# Patient Record
Sex: FEMALE | Race: WHITE | Employment: FULL TIME | URBAN - METROPOLITAN AREA
[De-identification: names, ages, dates, MRNs, and addresses within clinical notes are randomized per-mention and may not be internally consistent; named-entity substitution may affect disease eponyms.]

---

## 2018-03-07 PROBLEM — S39.012A STRAIN OF LUMBAR REGION: Status: ACTIVE | Noted: 2018-03-07

## 2018-03-07 PROBLEM — G44.219 EPISODIC TENSION-TYPE HEADACHE, NOT INTRACTABLE: Status: ACTIVE | Noted: 2018-03-07

## 2018-03-07 PROBLEM — M62.838 SPASM OF MUSCLE: Status: ACTIVE | Noted: 2018-03-07

## 2018-03-11 ENCOUNTER — APPOINTMENT (OUTPATIENT)
Dept: GENERAL RADIOLOGY | Age: 43
End: 2018-03-11
Payer: MEDICAID

## 2018-03-11 ENCOUNTER — HOSPITAL ENCOUNTER (EMERGENCY)
Age: 43
Discharge: HOME | End: 2018-03-11
Payer: MEDICAID

## 2018-03-11 VITALS
RESPIRATION RATE: 18 BRPM | DIASTOLIC BLOOD PRESSURE: 78 MMHG | WEIGHT: 157 LBS | SYSTOLIC BLOOD PRESSURE: 130 MMHG | HEIGHT: 72 IN | BODY MASS INDEX: 21.26 KG/M2 | OXYGEN SATURATION: 99 % | HEART RATE: 100 BPM | TEMPERATURE: 98.1 F

## 2018-03-11 DIAGNOSIS — S62.339A CLOSED BOXER'S FRACTURE, INITIAL ENCOUNTER: Primary | ICD-10-CM

## 2018-03-11 PROCEDURE — 73130 X-RAY EXAM OF HAND: CPT

## 2018-03-11 PROCEDURE — 6370000000 HC RX 637 (ALT 250 FOR IP): Performed by: PHYSICIAN ASSISTANT

## 2018-03-11 PROCEDURE — 99283 EMERGENCY DEPT VISIT LOW MDM: CPT

## 2018-03-11 RX ORDER — IBUPROFEN 600 MG/1
600 TABLET ORAL ONCE
Status: COMPLETED | OUTPATIENT
Start: 2018-03-11 | End: 2018-03-11

## 2018-03-11 RX ORDER — ACETAMINOPHEN AND CODEINE PHOSPHATE 300; 30 MG/1; MG/1
1 TABLET ORAL EVERY 6 HOURS PRN
Qty: 12 TABLET | Refills: 0 | Status: SHIPPED | OUTPATIENT
Start: 2018-03-11 | End: 2018-03-14

## 2018-03-11 RX ADMIN — IBUPROFEN 600 MG: 600 TABLET, FILM COATED ORAL at 14:16

## 2018-03-11 ASSESSMENT — PAIN SCALES - GENERAL
PAINLEVEL_OUTOF10: 5
PAINLEVEL_OUTOF10: 5

## 2018-03-11 ASSESSMENT — PAIN DESCRIPTION - PROGRESSION: CLINICAL_PROGRESSION: GRADUALLY WORSENING

## 2018-03-11 ASSESSMENT — PAIN DESCRIPTION - LOCATION: LOCATION: HAND

## 2018-03-11 ASSESSMENT — PAIN DESCRIPTION - ONSET: ONSET: SUDDEN

## 2018-03-11 ASSESSMENT — PAIN DESCRIPTION - FREQUENCY: FREQUENCY: CONTINUOUS

## 2018-03-11 ASSESSMENT — PAIN DESCRIPTION - DESCRIPTORS: DESCRIPTORS: SORE

## 2018-03-11 ASSESSMENT — PAIN DESCRIPTION - PAIN TYPE: TYPE: ACUTE PAIN

## 2018-03-11 ASSESSMENT — PAIN DESCRIPTION - ORIENTATION: ORIENTATION: RIGHT

## 2018-03-11 NOTE — ED PROVIDER NOTES
encounter      Plan   Discharge to home  Patient condition is stable    New Medications     New Prescriptions    ACETAMINOPHEN-CODEINE (TYLENOL/CODEINE #3) 300-30 MG PER TABLET    Take 1 tablet by mouth every 6 hours as needed for Pain for up to 3 days For severe pain only. Do not take working, driving or drinking alcohol. May cause drowsiness. Electronically signed by SIMON Crum   DD: 3/11/18  **This report was transcribed using voice recognition software. Every effort was made to ensure accuracy; however, inadvertent computerized transcription errors may be present.   END OF ED PROVIDER NOTE       SIMON Crum  03/11/18 [address] [place], [place]  03/11/18 8422

## 2018-11-06 ENCOUNTER — HOSPITAL ENCOUNTER (EMERGENCY)
Age: 43
Discharge: HOME | End: 2018-11-06
Payer: MEDICAID

## 2018-11-06 ENCOUNTER — APPOINTMENT (OUTPATIENT)
Dept: GENERAL RADIOLOGY | Age: 43
End: 2018-11-06
Payer: MEDICAID

## 2018-11-06 VITALS
HEIGHT: 72 IN | SYSTOLIC BLOOD PRESSURE: 110 MMHG | BODY MASS INDEX: 21.26 KG/M2 | OXYGEN SATURATION: 99 % | TEMPERATURE: 98.5 F | HEART RATE: 70 BPM | DIASTOLIC BLOOD PRESSURE: 70 MMHG | RESPIRATION RATE: 14 BRPM | WEIGHT: 157 LBS

## 2018-11-06 DIAGNOSIS — R05.9 COUGH: ICD-10-CM

## 2018-11-06 DIAGNOSIS — J40 BRONCHITIS: Primary | ICD-10-CM

## 2018-11-06 PROCEDURE — 6370000000 HC RX 637 (ALT 250 FOR IP): Performed by: PHYSICIAN ASSISTANT

## 2018-11-06 PROCEDURE — 99283 EMERGENCY DEPT VISIT LOW MDM: CPT

## 2018-11-06 PROCEDURE — 71046 X-RAY EXAM CHEST 2 VIEWS: CPT

## 2018-11-06 RX ORDER — ALBUTEROL SULFATE 90 UG/1
2 AEROSOL, METERED RESPIRATORY (INHALATION) EVERY 6 HOURS PRN
Qty: 1 INHALER | Refills: 0 | Status: SHIPPED | OUTPATIENT
Start: 2018-11-06 | End: 2021-03-22 | Stop reason: ALTCHOICE

## 2018-11-06 RX ORDER — METHYLPREDNISOLONE 4 MG/1
TABLET ORAL
Qty: 21 TABLET | Status: SHIPPED | OUTPATIENT
Start: 2018-11-06 | End: 2018-11-12

## 2018-11-06 RX ORDER — IPRATROPIUM BROMIDE AND ALBUTEROL SULFATE 2.5; .5 MG/3ML; MG/3ML
1 SOLUTION RESPIRATORY (INHALATION) ONCE
Status: COMPLETED | OUTPATIENT
Start: 2018-11-06 | End: 2018-11-06

## 2018-11-06 RX ORDER — BENZONATATE 100 MG/1
100 CAPSULE ORAL 3 TIMES DAILY PRN
Qty: 21 CAPSULE | Refills: 0 | Status: SHIPPED | OUTPATIENT
Start: 2018-11-06 | End: 2018-11-13

## 2018-11-06 RX ADMIN — IPRATROPIUM BROMIDE AND ALBUTEROL SULFATE 1 AMPULE: .5; 3 SOLUTION RESPIRATORY (INHALATION) at 13:31

## 2018-11-06 ASSESSMENT — PAIN SCALES - GENERAL: PAINLEVEL_OUTOF10: 6

## 2018-11-06 ASSESSMENT — PAIN DESCRIPTION - LOCATION: LOCATION: CHEST

## 2018-11-06 ASSESSMENT — PAIN DESCRIPTION - ORIENTATION: ORIENTATION: ANTERIOR

## 2018-11-06 ASSESSMENT — PAIN DESCRIPTION - FREQUENCY: FREQUENCY: CONTINUOUS

## 2018-11-06 NOTE — ED PROVIDER NOTES
[facility], [place]     Department of Emergency Medicine   ED  Provider Note  Admit Date/RoomTime: 11/6/2018 12:33 PM  ED Room: 03/03  Chief Complaint:   Cough (x 2 weeks) and Nasal Congestion (started 2 weeks ago)    History of Present Illness   Source of history provided by:  patient. History/Exam Limitations: none. Dom Gomez is a 44 y.o. old female with a past medical history of:   Past Medical History:   Diagnosis Date    Anxiety     Asthma     COPD (chronic obstructive pulmonary disease) ([address]     told a few weeks ago at [facility] Ectopic pregnancy     Migraines     Ovarian cyst     Tobacco abuse     presents to the emergency department by private vehicle, for cough and nasal congestion, which began 2 week(s) prior to arrival.  Since onset the symptoms have been persistent and mild in severity. The symptoms are associated with nasal congestion, sinus headache, and non-productive cough. There has been NO fever, chills, nausea, vomiting, hemoptysis, chest pain, SOB. Patient states that her 25year-old son had similar symptoms. Patient states that she is no longer a smoker because she quit 3 months ago had a history of 6-7 years of smoking. She denies any recent travel, recent surgery, calf pain, swelling, history of blood clots, birth control use or estrogen replacement. She notes she has a hx of COPD and asthma. PERC Rule for PE:      Age ? 50 negative     HR ? 100 negative     O2 Sat on Room Air < 95% negative     Prior History of DVT/PE negative     Recent Trauma or Surgery negative     Hemoptysis negative     Exogenous Estrogen/Hormone Use negative     Unilateral Extgremity Swelling negative     * If ANY Criteria are positive, the PERC rule is not satisfied and cannot be used to rule out PE in this patient. ROS   Pertinent positives and negatives are stated within HPI, all other systems reviewed and are negative.     Past Surgical History:  has a past surgical history that includes

## 2019-10-27 ENCOUNTER — APPOINTMENT (OUTPATIENT)
Dept: CT IMAGING | Age: 44
End: 2019-10-27
Payer: MEDICAID

## 2019-10-27 ENCOUNTER — HOSPITAL ENCOUNTER (EMERGENCY)
Age: 44
Discharge: HOME | End: 2019-10-27
Payer: MEDICAID

## 2019-10-27 VITALS
RESPIRATION RATE: 16 BRPM | BODY MASS INDEX: 21.13 KG/M2 | HEIGHT: 72 IN | DIASTOLIC BLOOD PRESSURE: 65 MMHG | HEART RATE: 88 BPM | SYSTOLIC BLOOD PRESSURE: 125 MMHG | WEIGHT: 156 LBS | TEMPERATURE: 98.7 F | OXYGEN SATURATION: 98 %

## 2019-10-27 DIAGNOSIS — S30.0XXA CONTUSION OF SACRUM, INITIAL ENCOUNTER: Primary | ICD-10-CM

## 2019-10-27 LAB
HCG, URINE, POC: NEGATIVE
Lab: NORMAL
NEGATIVE QC PASS/FAIL: NORMAL
POSITIVE QC PASS/FAIL: NORMAL

## 2019-10-27 PROCEDURE — 72192 CT PELVIS W/O DYE: CPT

## 2019-10-27 PROCEDURE — 6360000002 HC RX W HCPCS: Performed by: PHYSICIAN ASSISTANT

## 2019-10-27 PROCEDURE — 99284 EMERGENCY DEPT VISIT MOD MDM: CPT

## 2019-10-27 PROCEDURE — 96372 THER/PROPH/DIAG INJ SC/IM: CPT

## 2019-10-27 RX ORDER — KETOROLAC TROMETHAMINE 30 MG/ML
60 INJECTION, SOLUTION INTRAMUSCULAR; INTRAVENOUS ONCE
Status: COMPLETED | OUTPATIENT
Start: 2019-10-27 | End: 2019-10-27

## 2019-10-27 RX ORDER — HYDROCODONE BITARTRATE AND ACETAMINOPHEN 5; 325 MG/1; MG/1
1 TABLET ORAL EVERY 6 HOURS PRN
Qty: 12 TABLET | Refills: 0 | Status: SHIPPED | OUTPATIENT
Start: 2019-10-27 | End: 2019-10-30

## 2019-10-27 RX ADMIN — KETOROLAC TROMETHAMINE 60 MG: 30 INJECTION, SOLUTION INTRAMUSCULAR at 21:41

## 2019-10-27 ASSESSMENT — PAIN DESCRIPTION - PAIN TYPE: TYPE: ACUTE PAIN

## 2019-10-27 ASSESSMENT — PAIN SCALES - GENERAL
PAINLEVEL_OUTOF10: 9
PAINLEVEL_OUTOF10: 8

## 2019-10-27 ASSESSMENT — PAIN DESCRIPTION - LOCATION: LOCATION: BUTTOCKS

## 2021-02-06 ENCOUNTER — HOSPITAL ENCOUNTER (EMERGENCY)
Age: 46
Discharge: HOME | End: 2021-02-06
Payer: COMMERCIAL

## 2021-02-06 VITALS
OXYGEN SATURATION: 100 % | RESPIRATION RATE: 14 BRPM | BODY MASS INDEX: 19.64 KG/M2 | WEIGHT: 145 LBS | DIASTOLIC BLOOD PRESSURE: 85 MMHG | HEART RATE: 95 BPM | HEIGHT: 72 IN | TEMPERATURE: 98.3 F | SYSTOLIC BLOOD PRESSURE: 137 MMHG

## 2021-02-06 DIAGNOSIS — S61.412A LACERATION OF LEFT HAND WITHOUT FOREIGN BODY, INITIAL ENCOUNTER: Primary | ICD-10-CM

## 2021-02-06 PROCEDURE — 6360000002 HC RX W HCPCS: Performed by: PHYSICIAN ASSISTANT

## 2021-02-06 PROCEDURE — 90471 IMMUNIZATION ADMIN: CPT | Performed by: PHYSICIAN ASSISTANT

## 2021-02-06 PROCEDURE — 99283 EMERGENCY DEPT VISIT LOW MDM: CPT

## 2021-02-06 PROCEDURE — 12002 RPR S/N/AX/GEN/TRNK2.6-7.5CM: CPT

## 2021-02-06 PROCEDURE — 90715 TDAP VACCINE 7 YRS/> IM: CPT | Performed by: PHYSICIAN ASSISTANT

## 2021-02-06 RX ORDER — NAPROXEN 500 MG/1
500 TABLET ORAL 2 TIMES DAILY
Qty: 60 TABLET | Refills: 0 | Status: SHIPPED | OUTPATIENT
Start: 2021-02-06 | End: 2021-03-22 | Stop reason: ALTCHOICE

## 2021-02-06 RX ORDER — LIDOCAINE HYDROCHLORIDE 10 MG/ML
20 INJECTION, SOLUTION INFILTRATION; PERINEURAL ONCE
Status: DISCONTINUED | OUTPATIENT
Start: 2021-02-06 | End: 2021-02-06 | Stop reason: HOSPADM

## 2021-02-06 RX ADMIN — TETANUS TOXOID, REDUCED DIPHTHERIA TOXOID AND ACELLULAR PERTUSSIS VACCINE, ADSORBED 0.5 ML: 5; 2.5; 8; 8; 2.5 SUSPENSION INTRAMUSCULAR at 14:14

## 2021-02-06 NOTE — ED PROVIDER NOTES
SpO2 100%   BMI 19.67 kg/m²      Oxygen Saturation Interpretation: Normal.    Constitutional:  Alert and oriented x4, development consistent with age. HEENT:  NC/NT. Airway patent. PERRLA, EOMI   Neck:  Normal ROM. Supple. Non-tender. Extremities: 3cm laceration to dorsal aspect of proximal thumb and snuffbox region, no active bleeding, full ROM thumb and all other fingers, intact sensations distally, full ROM wrist   Lymphatics: No lymphangitis or adenopathy noted. Neurological: CN II-XII grossly intact, Motor functions intact. Lab / Imaging Results   (All laboratory and radiology results have been personally reviewed by myself)  Labs:  No results found for this visit on 02/06/21. Imaging: All Radiology results interpreted by Radiologist unless otherwise noted. No orders to display     ED Course / Medical Decision Making     Medications   Tetanus-Diphth-Acell Pertussis (BOOSTRIX) injection 0.5 mL (0.5 mLs Intramuscular Given 2/6/21 1414)      Consult(s):  None    Procedure(s):  PROCEDURE NOTE      LACERATION REPAIR  Risks, benefits and alternatives (for applicable procedures below) described. Performed By: Tomás Christian PA-C. Laceration #: 1. Location: left thumb- snuff box region   Length: 3cm. The wound area was irrigated with sterile saline, cleansed with povidone iodine, cleansend with shur-clens and draped in a sterile fashion. Local Anesthesia:  Lidocaine 1% without epinephrine. The wound was explored with the following results:  no foreign body or tendon injury seen. Flaps Aligned: yes. The wound was closed with 5-0 Ethilon using interrupted sutures. Dressing:  bacitracin and a sterile dressing was placed. Total number suture:  7. There were no additional lacerations requiring repair. MDM:     Counseling:     The emergency provider has spoken with the patient/caregiver and discussed todays results, in addition to providing specific details for the plan of care and counseling regarding the diagnosis and prognosis. Questions are answered at this time and they are agreeable with the plan. Pt was educated on signs of infection and wound care. Pt was advised to return to ED if signs of infection or fever develop. Pt understands they must follow-up with PCP for suture removal and/or wound check. Pt remained nontoxic, afebrile, and A&O x3 during this ED visit. They agreed with plan of care, discharge, and importance of follow-up. Pt was in no distress at discharge. Vitals stable. All results reviewed with pt and all questions answered. Patient was educated on newly prescribed medication. Patient educated on wound care. Assessment     1. Laceration of left hand without foreign body, initial encounter      Plan   Discharge to home  Patient condition is good    New Medications     Discharge Medication List as of 2/6/2021  2:34 PM      START taking these medications    Details   naproxen (NAPROSYN) 500 MG tablet Take 1 tablet by mouth 2 times daily, Disp-60 tablet, R-0Print             Electronically signed by Grace Suh PA-C   DD: 2/6/21  **This report was transcribed using voice recognition software. Every effort was made to ensure accuracy; however, inadvertent computerized transcription errors may be present.     END OF ED PROVIDER NOTE        Grace Suh PA-C  02/06/21 4862

## 2021-03-22 ENCOUNTER — APPOINTMENT (OUTPATIENT)
Dept: GENERAL RADIOLOGY | Age: 46
End: 2021-03-22
Payer: COMMERCIAL

## 2021-03-22 ENCOUNTER — HOSPITAL ENCOUNTER (EMERGENCY)
Age: 46
Discharge: HOME | End: 2021-03-22
Payer: COMMERCIAL

## 2021-03-22 VITALS
SYSTOLIC BLOOD PRESSURE: 110 MMHG | WEIGHT: 147 LBS | BODY MASS INDEX: 19.91 KG/M2 | DIASTOLIC BLOOD PRESSURE: 76 MMHG | HEIGHT: 72 IN | OXYGEN SATURATION: 97 % | TEMPERATURE: 97.5 F | HEART RATE: 90 BPM | RESPIRATION RATE: 16 BRPM

## 2021-03-22 DIAGNOSIS — S46.911A STRAIN OF RIGHT SHOULDER, INITIAL ENCOUNTER: Primary | ICD-10-CM

## 2021-03-22 PROCEDURE — 99283 EMERGENCY DEPT VISIT LOW MDM: CPT

## 2021-03-22 PROCEDURE — 6360000002 HC RX W HCPCS: Performed by: NURSE PRACTITIONER

## 2021-03-22 PROCEDURE — 96372 THER/PROPH/DIAG INJ SC/IM: CPT

## 2021-03-22 PROCEDURE — 73030 X-RAY EXAM OF SHOULDER: CPT

## 2021-03-22 PROCEDURE — 73080 X-RAY EXAM OF ELBOW: CPT

## 2021-03-22 PROCEDURE — 73060 X-RAY EXAM OF HUMERUS: CPT

## 2021-03-22 RX ORDER — KETOROLAC TROMETHAMINE 30 MG/ML
30 INJECTION, SOLUTION INTRAMUSCULAR; INTRAVENOUS ONCE
Status: COMPLETED | OUTPATIENT
Start: 2021-03-22 | End: 2021-03-22

## 2021-03-22 RX ORDER — CYCLOBENZAPRINE HCL 5 MG
5 TABLET ORAL 2 TIMES DAILY PRN
Qty: 10 TABLET | Refills: 0 | Status: SHIPPED | OUTPATIENT
Start: 2021-03-22 | End: 2021-04-01

## 2021-03-22 RX ADMIN — KETOROLAC TROMETHAMINE 30 MG: 30 INJECTION, SOLUTION INTRAMUSCULAR at 20:14

## 2021-03-22 ASSESSMENT — PAIN DESCRIPTION - DESCRIPTORS: DESCRIPTORS: CONSTANT;THROBBING;SHARP

## 2021-03-22 ASSESSMENT — PAIN DESCRIPTION - ONSET: ONSET: ON-GOING

## 2021-03-22 ASSESSMENT — PAIN SCALES - GENERAL: PAINLEVEL_OUTOF10: 7

## 2021-03-22 ASSESSMENT — PAIN DESCRIPTION - ORIENTATION: ORIENTATION: RIGHT

## 2021-03-22 ASSESSMENT — PAIN DESCRIPTION - PROGRESSION: CLINICAL_PROGRESSION: NOT CHANGED

## 2021-03-22 ASSESSMENT — PAIN DESCRIPTION - LOCATION: LOCATION: ARM;SHOULDER

## 2021-03-23 NOTE — ED NOTES
Discharged  To follow with ortho referral-  When asked about pain-  Pt replied  \"It's ten times worse\"  rx x 1 with ice pack given     Hal Vásquez RN  03/22/21 [id number]

## 2021-03-23 NOTE — ED PROVIDER NOTES
[address]  Department of Emergency Medicine   ED  Encounter Note  Admit Date/RoomTime: 3/22/2021  7:44 PM  ED Room: 15/15    NAME: Juan Jose Tyler  : 1975  MRN: [id number]     Chief Complaint:  Shoulder Pain (right sided for past couple of months. patient states she was taking care of a Pitty, and she thinks it may have happened during the time she cared for it d/t pulling when walking) and Arm Pain    History of Present Illness       Juan Jose Tyler is a 41 y.o. old female who presents to the emergency department for complaint of right shoulder pain and right humerus pain. She reports onset of symptoms approximately 2 months ago. Reports watching a friend's pit bull dog, who weighed approximately 150 pounds, pulled her right arm while she was walking the dog. Reports that the pain has continued over the past several weeks. She reports being right-hand dominant. That she has been using ibuprofen with no significant improvement. Denies paresthesias, fever, chills, extremity weakness, or any other complaints at this time. Since onset the symptoms have been waxing and waning. Her pain is aggraveated by any movement or pressure on or palpation of and relieved by nothing. She denies any head injury, headache, loss of consciousness, confusion, chest pain, abdominal pain, back pain, numbness or weakness. ROS   Pertinent positives and negatives are stated within HPI, all other systems reviewed and are negative. Past Medical History:  has a past medical history of Anxiety, Asthma, COPD (chronic obstructive pulmonary disease) ([address] Ectopic pregnancy, Migraines, Ovarian cyst, and Tobacco abuse. Surgical History:  has a past surgical history that includes Ovarian cyst surgery. Social History:  reports that she has been smoking. She has a 2.25 pack-year smoking history.  She has never used smokeless tobacco. She reports that she does not drink alcohol or use drugs. Family History: family history includes Heart Attack in her father; Heart Disease in her mother; Stroke in her father. Allergies: Morphine and Penicillins    Physical Exam   Oxygen Saturation Interpretation: Normal.        ED Triage Vitals   BP Temp Temp Source Pulse Resp SpO2 Height Weight   03/22/21 1943 03/22/21 1943 03/22/21 1943 03/22/21 2108 03/22/21 1943 03/22/21 2108 03/22/21 1950 03/22/21 1950   110/76 97.5 °F (36.4 °C) Temporal 90 16 97 % 6' (1.829 m) 147 lb (66.7 kg)         Constitutional:  Alert, development consistent with age. Neck:  Normal ROM. Supple. Non-tender. Shoulder:  Right acromioclavicular joint. Tenderness: mild              Swelling: None. Deformity: no.              ROM: diminished range with pain. Skin:  normal exam; no wounds, erythema, or swelling. Neurovascular: Motor deficit: none. Sensory deficit: none. Pulse deficit: none. Capillary refill: normal.    Elbow:              Tenderness:  mild. Swelling: None. Deformity: no.              ROM: full range with pain. Skin:  normal exam; no wounds, erythema, or swelling. Mild tenderness noted to bicep region. No swelling, erythema, or wounds. Full flexion and extension of right elbow. No neurovascular deficits. No motor or sensory deficits. Compartments soft and compressible. Lymphatics: No lymphangitis or edema noted  Neurological:  Oriented. Motor functions intact. Lab / Imaging Results   (All laboratory and radiology results have been personally reviewed by myself)  Labs:  No results found for this visit on 03/22/21. Imaging: All Radiology results interpreted by Radiologist unless otherwise noted. XR SHOULDER RIGHT (MIN 2 VIEWS)   Final Result   No acute abnormality. XR HUMERUS RIGHT (MIN 2 VIEWS)   Final Result   No acute osseous abnormality.          XR ELBOW RIGHT (MIN 3 VIEWS)   Final Result   No acute abnormality. ED Course / Medical Decision Making     Medications   ketorolac (TORADOL) injection 30 mg (30 mg Intramuscular Given 3/22/21 2014)          Consult(s):   None    Procedure(s):   none    MDM:      Steven Camacho is a 78-year-old female who presented to the emergency department for complaint of right shoulder and right upper extremity pain. She reported onset of symptoms 2 months ago after she was walking a pit bull who weighed approximately 150 pounds and pulled on her right arm. No paresthesias. No extremity weakness. No neurovascular deficits. Compartments soft and compressible. No motor or sensory deficits. Imaging of right shoulder, right humerus, and right elbow were all reassuring for no acute osseous abnormalities or malalignments. Her pain was primarily localized to her right biceps region. She had full flexion and extension of her right elbow. He was given orthopedic contact information and instructed to call for follow-up appointment. She verbalized understanding agrees with plan. She remained hemodynamically stable, nontoxic, and appropriate for outpatient management. Prescribed Flexeril. He was instructed to continue with NSAID as prescribed. Advised to return for any new, change, worsening symptoms or concerns. At this time the patient is without objective evidence of an acute process requiring hospitalization or inpatient management. They have remained hemodynamically stable throughout their entire ED visit and are stable for discharge with outpatient follow-up. The plan has been discussed in detail and they are aware of the specific conditions for emergent return, as well as the importance of follow-up. Plan of Care/Counseling:  I reviewed today's visit with the patient in addition to providing specific details for the plan of care and counseling regarding the diagnosis and prognosis.   Questions are answered at

## 2021-04-23 ENCOUNTER — HOSPITAL ENCOUNTER (EMERGENCY)
Age: 46
Discharge: HOME | End: 2021-04-23
Payer: COMMERCIAL

## 2021-04-23 VITALS
SYSTOLIC BLOOD PRESSURE: 123 MMHG | HEIGHT: 72 IN | WEIGHT: 147 LBS | DIASTOLIC BLOOD PRESSURE: 58 MMHG | OXYGEN SATURATION: 99 % | BODY MASS INDEX: 19.91 KG/M2 | TEMPERATURE: 97.5 F | HEART RATE: 80 BPM | RESPIRATION RATE: 18 BRPM

## 2021-04-23 DIAGNOSIS — M25.511 RIGHT SHOULDER PAIN, UNSPECIFIED CHRONICITY: Primary | ICD-10-CM

## 2021-04-23 LAB
HCG, URINE, POC: NEGATIVE
Lab: NORMAL
NEGATIVE QC PASS/FAIL: NORMAL
POSITIVE QC PASS/FAIL: NORMAL

## 2021-04-23 PROCEDURE — 99283 EMERGENCY DEPT VISIT LOW MDM: CPT

## 2021-04-23 RX ORDER — HYDROCODONE BITARTRATE AND ACETAMINOPHEN 5; 325 MG/1; MG/1
1 TABLET ORAL EVERY 12 HOURS PRN
Qty: 6 TABLET | Refills: 0 | Status: SHIPPED | OUTPATIENT
Start: 2021-04-23 | End: 2021-04-26

## 2021-04-23 ASSESSMENT — PAIN DESCRIPTION - ORIENTATION: ORIENTATION: RIGHT

## 2021-04-23 ASSESSMENT — PAIN SCALES - GENERAL: PAINLEVEL_OUTOF10: 7

## 2021-04-23 ASSESSMENT — PAIN DESCRIPTION - PAIN TYPE: TYPE: ACUTE PAIN

## 2021-04-23 ASSESSMENT — PAIN DESCRIPTION - LOCATION: LOCATION: SHOULDER

## 2021-04-23 NOTE — ED PROVIDER NOTES
[facility]  Department of Emergency Medicine   ED  Encounter Note  Admit Date/RoomTime: 2021 12:44 PM  ED Room: 35/35    NAME: Evaristo Harp  : 1975  MRN: [id number]     Chief Complaint:  Shoulder Pain (right shoulder (was seen in [place] [facility] 2.5 weeks ago, reports MRI shows torn rotator cuff))    History of Present Illness       Evaristo Harp is a 41 y.o. old female who presents to the emergency department by private vehicle, for traumatic Right shoulder pain which occured 3 month(s) prior to arrival.  The complaint is due to watching her friend's dog who weighs 150 pounds when she looked down to check her phone and the dog jerked the leash to rose after a rabbit. Patient has no prior history of pain/injury with regards to today's visit. She is right handed. Since onset the symptoms have been recurrent and worsening. Her pain is aggraveated by any movement, any use of or pressure on or palpation of and relieved by nothing, as no treatment has been provided prior to this visit. She denies any head injury, headache, loss of consciousness, confusion, dizziness, neck pain, chest pain, abdominal pain, back pain, numbness, weakness, blurred vision, nausea or vomiting. She was seen here 1 month ago on . And was referred to orthopedics at that time. She states she called but never heard back from them and never retry calling. She followed up with a friend's chiropractor who ordered an MRI which she completed 3 days ago. She states pain is becoming unbearable. She denies any new injury or trauma besides the incident 3 months ago. She does not have a PCP to follow-up with. She states that she was given a sling but does not like the feeling of it has not been wearing it. ROS   Pertinent positives and negatives are stated within HPI, all other systems reviewed and are negative.     Past Medical History:  has a past medical history of Anxiety, Asthma, COPD (chronic obstructive pulmonary disease) ([facility] [address] Ectopic pregnancy, Migraines, Ovarian cyst, and Tobacco abuse. Surgical History:  has a past surgical history that includes Ovarian cyst surgery. Social History:  reports that she has been smoking. She has a 2.25 pack-year smoking history. She has never used smokeless tobacco. She reports that she does not drink alcohol or use drugs. Family History: family history includes Heart Attack in her father; Heart Disease in her mother; Stroke in her father. Allergies: Morphine and Penicillins    Physical Exam   Oxygen Saturation Interpretation: Normal.        ED Triage Vitals   BP Temp Temp Source Pulse Resp SpO2 Height Weight   04/23/21 1241 04/23/21 1239 04/23/21 1239 04/23/21 1239 04/23/21 1239 04/23/21 1239 04/23/21 1239 04/23/21 1239   (!) 123/58 97.5 °F (36.4 °C) Oral 80 18 99 % 6' (1.829 m) 147 lb (66.7 kg)         Constitutional:  Alert, development consistent with age. Neck:  Normal ROM. Supple. Non-tender. Shoulder:  Right anterior. Tenderness: moderate              Swelling: None. Deformity: no.              ROM: diminished range with pain. Skin:  normal exam; no wounds, erythema, or swelling. Neurovascular: Motor deficit: none. Sensory deficit: none. Pulse deficit: none. Capillary refill: normal.    Elbow:              Tenderness:  none. Swelling: None. Deformity: no.              ROM: full painless ROM. Skin:  normal exam; no wounds, erythema, or swelling. Lymphatics: No lymphangitis or edema noted  Neurological:  Oriented. Motor functions intact.     Lab / Imaging Results   (All laboratory and radiology results have been personally reviewed by myself)  Labs:  Results for orders placed or performed during the hospital encounter of 04/23/21   POC Pregnancy Urine   Result Value Ref Range    HCG, Urine, POC Negative Negative    Lot Number [id number]     Positive QC Pass/Fail Acceptable     Negative QC Pass/Fail Acceptable      Imaging: All Radiology results interpreted by Radiologist unless otherwise noted. No orders to display     Patient had MRI of right shoulder without contrast on 4 2021. Results were reviewed and showed a superspinatus tendinosis with a small high grade interstitial tear at the central insertional tendon fibers. Inferior glenohumeral ligament capsular thickening can be seen in the in the clinical setting of adhesive capsulitis if clinically appropriate. Minor acro midclavicular arthrosis with mild subcromial bursal edema. ED Course / Medical Decision Making   Medications - No data to display     Re-examination:  4/23/21       Time: 1310-patient states she was driving to the emergency room today. She states he is unable to get a ride home. She is refusing Tylenol or NSAIDs in the emergency room and states that she has it at home. 1327-discussed with patient a short 3-day prescription of pain medication, Leobardo Star will be prescribed until patient can follow-up Monday with orthopedics due to it being the weekend. She states verbal understanding. OARRS was reviewed and no signs symptoms of drug abuse was noted. Discussed potential side effects of starting this medication appropriate use. Struck to her this may increase the risk of sedation addiction and not to drive when taking this medication. She states verbal understanding. Instructed her to be placed in a sling. Patient refuses a sling. Consult(s):   None    Procedure(s):   none    MDM: Patient presents to the ED for right shoulder pain which she had an MRI done a few days ago which showed a tear. Differential diagnoses included but not limited to new injury versus uncontrolled pain. Workup in the ED revealed patient has no neurovascular compromise.   Patient has limited range of motion due to pain and tenderness on the anterior aspect. MRI from [facility] imaging was reviewed which showed a tear. Patient states her chiropractor ordered this and is unable to give her pain medication. She has no PCP and does not have a referral for orthopedic at this time. Her chiropractor has not gotten back to her about referral for orthopedic. She refuses sling and states she does not like it around her neck. I was unable to give her anything for pain due to not wanting any NSAIDs, already taking Tylenol and has unable to get a ride home. Patient continues to be non-toxic on re-evaluation. Findings were discussed with the patient and reasons to immediately return to the ED were articulated to them. They will follow-up with their PMD and orthopedics. Family medicine clinic referral was given for PCP. Mary Tolbert OARRS was reviewed and no signs symptoms of drug abuse was noted. Patient was given 1 short course prescription for Norco for pain until patient can follow-up on Monday for further evaluation. Patient states she is unable to take NSAIDs and Tylenol has not been helping. Discussed potential side effects of starting this medication, including sedation addiction. Instructed her not to drive will take this medication. She states verbal understanding. Plan of Care/Counseling:  I reviewed today's visit with the patient in addition to providing specific details for the plan of care and counseling regarding the diagnosis and prognosis. Questions are answered at this time and are agreeable with the plan. Assessment      1. Right shoulder pain, unspecified chronicity      Plan   Discharge home. Patient condition is stable    New Medications     Discharge Medication List as of 4/23/2021  1:29 PM      START taking these medications    Details   HYDROcodone-acetaminophen (NORCO) 5-325 MG per tablet Take 1 tablet by mouth every 12 hours as needed for Pain for up to 3 days. Intended supply: 3 days.  Take lowest dose possible to manage pain, Disp-6 tablet, R-0Print           Electronically signed by GUME Pepper CNP   DD: 4/23/21  **This report was transcribed using voice recognition software. Every effort was made to ensure accuracy; however, inadvertent computerized transcription errors may be present.   END OF ED PROVIDER NOTE          GUME Pepper CNP  04/23/21 [id number]

## 2021-10-27 ENCOUNTER — HOSPITAL ENCOUNTER (EMERGENCY)
Age: 46
Discharge: LEFT BEFORE DISCHARGE | End: 2021-10-27
Payer: COMMERCIAL

## 2021-10-27 ENCOUNTER — HOSPITAL ENCOUNTER (INPATIENT)
Age: 46
LOS: 2 days | Discharge: HOME | DRG: 364 | End: 2021-10-30
Attending: STUDENT IN AN ORGANIZED HEALTH CARE EDUCATION/TRAINING PROGRAM | Admitting: INTERNAL MEDICINE
Payer: COMMERCIAL

## 2021-10-27 ENCOUNTER — APPOINTMENT (OUTPATIENT)
Dept: CT IMAGING | Age: 46
DRG: 364 | End: 2021-10-27
Payer: COMMERCIAL

## 2021-10-27 DIAGNOSIS — D72.829 LEUKOCYTOSIS, UNSPECIFIED TYPE: ICD-10-CM

## 2021-10-27 DIAGNOSIS — L03.211 FACIAL CELLULITIS: Primary | ICD-10-CM

## 2021-10-27 DIAGNOSIS — L02.91 ABSCESS: ICD-10-CM

## 2021-10-27 LAB
ANION GAP SERPL CALCULATED.3IONS-SCNC: 13 MMOL/L (ref 7–16)
BASOPHILS ABSOLUTE: 0.06 E9/L (ref 0–0.2)
BASOPHILS RELATIVE PERCENT: 0.3 % (ref 0–2)
BUN BLDV-MCNC: 8 MG/DL (ref 6–20)
C-REACTIVE PROTEIN: 3.4 MG/DL (ref 0–0.4)
CALCIUM SERPL-MCNC: 9.1 MG/DL (ref 8.6–10.2)
CHLORIDE BLD-SCNC: 101 MMOL/L (ref 98–107)
CO2: 23 MMOL/L (ref 22–29)
CREAT SERPL-MCNC: 0.6 MG/DL (ref 0.5–1)
EOSINOPHILS ABSOLUTE: 0.07 E9/L (ref 0.05–0.5)
EOSINOPHILS RELATIVE PERCENT: 0.4 % (ref 0–6)
GFR AFRICAN AMERICAN: >60
GFR NON-AFRICAN AMERICAN: >60 ML/MIN/1.73
GLUCOSE BLD-MCNC: 98 MG/DL (ref 74–99)
HCT VFR BLD CALC: 44.3 % (ref 34–48)
HEMOGLOBIN: 14.8 G/DL (ref 11.5–15.5)
IMMATURE GRANULOCYTES #: 0.12 E9/L
IMMATURE GRANULOCYTES %: 0.6 % (ref 0–5)
LACTIC ACID: 0.9 MMOL/L (ref 0.5–2.2)
LYMPHOCYTES ABSOLUTE: 2.02 E9/L (ref 1.5–4)
LYMPHOCYTES RELATIVE PERCENT: 10.2 % (ref 20–42)
MCH RBC QN AUTO: 30.1 PG (ref 26–35)
MCHC RBC AUTO-ENTMCNC: 33.4 % (ref 32–34.5)
MCV RBC AUTO: 90.2 FL (ref 80–99.9)
MONOCYTES ABSOLUTE: 1.47 E9/L (ref 0.1–0.95)
MONOCYTES RELATIVE PERCENT: 7.4 % (ref 2–12)
NEUTROPHILS ABSOLUTE: 16.01 E9/L (ref 1.8–7.3)
NEUTROPHILS RELATIVE PERCENT: 81.1 % (ref 43–80)
PDW BLD-RTO: 13.2 FL (ref 11.5–15)
PLATELET # BLD: 291 E9/L (ref 130–450)
PMV BLD AUTO: 9.9 FL (ref 7–12)
POTASSIUM SERPL-SCNC: 4 MMOL/L (ref 3.5–5)
RBC # BLD: 4.91 E12/L (ref 3.5–5.5)
REASON FOR REJECTION: NORMAL
REJECTED TEST: NORMAL
SEDIMENTATION RATE, ERYTHROCYTE: 15 MM/HR (ref 0–20)
SODIUM BLD-SCNC: 137 MMOL/L (ref 132–146)
WBC # BLD: 19.8 E9/L (ref 4.5–11.5)

## 2021-10-27 PROCEDURE — 85651 RBC SED RATE NONAUTOMATED: CPT

## 2021-10-27 PROCEDURE — 87040 BLOOD CULTURE FOR BACTERIA: CPT

## 2021-10-27 PROCEDURE — 6360000002 HC RX W HCPCS: Performed by: STUDENT IN AN ORGANIZED HEALTH CARE EDUCATION/TRAINING PROGRAM

## 2021-10-27 PROCEDURE — 96372 THER/PROPH/DIAG INJ SC/IM: CPT

## 2021-10-27 PROCEDURE — 96361 HYDRATE IV INFUSION ADD-ON: CPT

## 2021-10-27 PROCEDURE — 70450 CT HEAD/BRAIN W/O DYE: CPT

## 2021-10-27 PROCEDURE — 99285 EMERGENCY DEPT VISIT HI MDM: CPT

## 2021-10-27 PROCEDURE — 6360000002 HC RX W HCPCS

## 2021-10-27 PROCEDURE — 96376 TX/PRO/DX INJ SAME DRUG ADON: CPT

## 2021-10-27 PROCEDURE — 96375 TX/PRO/DX INJ NEW DRUG ADDON: CPT

## 2021-10-27 PROCEDURE — 86140 C-REACTIVE PROTEIN: CPT

## 2021-10-27 PROCEDURE — 2580000003 HC RX 258: Performed by: STUDENT IN AN ORGANIZED HEALTH CARE EDUCATION/TRAINING PROGRAM

## 2021-10-27 PROCEDURE — 6360000004 HC RX CONTRAST MEDICATION: Performed by: RADIOLOGY

## 2021-10-27 PROCEDURE — 83605 ASSAY OF LACTIC ACID: CPT

## 2021-10-27 PROCEDURE — 96365 THER/PROPH/DIAG IV INF INIT: CPT

## 2021-10-27 PROCEDURE — 80048 BASIC METABOLIC PNL TOTAL CA: CPT

## 2021-10-27 PROCEDURE — 36415 COLL VENOUS BLD VENIPUNCTURE: CPT

## 2021-10-27 PROCEDURE — 85025 COMPLETE CBC W/AUTO DIFF WBC: CPT

## 2021-10-27 PROCEDURE — 70487 CT MAXILLOFACIAL W/DYE: CPT

## 2021-10-27 PROCEDURE — 96366 THER/PROPH/DIAG IV INF ADDON: CPT

## 2021-10-27 RX ORDER — PROMETHAZINE HYDROCHLORIDE 25 MG/ML
12.5 INJECTION, SOLUTION INTRAMUSCULAR; INTRAVENOUS ONCE
Status: COMPLETED | OUTPATIENT
Start: 2021-10-27 | End: 2021-10-27

## 2021-10-27 RX ORDER — AMPICILLIN AND SULBACTAM 2; 1 G/1; G/1
INJECTION, POWDER, FOR SOLUTION INTRAMUSCULAR; INTRAVENOUS
Status: COMPLETED
Start: 2021-10-27 | End: 2021-10-27

## 2021-10-27 RX ORDER — FENTANYL CITRATE 50 UG/ML
50 INJECTION, SOLUTION INTRAMUSCULAR; INTRAVENOUS ONCE
Status: COMPLETED | OUTPATIENT
Start: 2021-10-27 | End: 2021-10-27

## 2021-10-27 RX ORDER — DEXAMETHASONE SODIUM PHOSPHATE 10 MG/ML
10 INJECTION INTRAMUSCULAR; INTRAVENOUS ONCE
Status: COMPLETED | OUTPATIENT
Start: 2021-10-27 | End: 2021-10-27

## 2021-10-27 RX ORDER — KETOROLAC TROMETHAMINE 30 MG/ML
15 INJECTION, SOLUTION INTRAMUSCULAR; INTRAVENOUS ONCE
Status: COMPLETED | OUTPATIENT
Start: 2021-10-27 | End: 2021-10-27

## 2021-10-27 RX ORDER — 0.9 % SODIUM CHLORIDE 0.9 %
1000 INTRAVENOUS SOLUTION INTRAVENOUS ONCE
Status: COMPLETED | OUTPATIENT
Start: 2021-10-27 | End: 2021-10-27

## 2021-10-27 RX ORDER — IBUPROFEN 800 MG/1
1 TABLET ORAL 3 TIMES DAILY PRN
COMMUNITY
Start: 2021-10-26

## 2021-10-27 RX ORDER — CLINDAMYCIN HYDROCHLORIDE 300 MG/1
1 CAPSULE ORAL 3 TIMES DAILY
COMMUNITY
Start: 2021-10-26

## 2021-10-27 RX ORDER — AMPICILLIN AND SULBACTAM 2; 1 G/1; G/1
INJECTION, POWDER, FOR SOLUTION INTRAMUSCULAR; INTRAVENOUS
Status: DISPENSED
Start: 2021-10-27 | End: 2021-10-28

## 2021-10-27 RX ORDER — METOCLOPRAMIDE HYDROCHLORIDE 5 MG/ML
10 INJECTION INTRAMUSCULAR; INTRAVENOUS ONCE
Status: COMPLETED | OUTPATIENT
Start: 2021-10-27 | End: 2021-10-27

## 2021-10-27 RX ORDER — DIPHENHYDRAMINE HYDROCHLORIDE 50 MG/ML
25 INJECTION INTRAMUSCULAR; INTRAVENOUS ONCE
Status: COMPLETED | OUTPATIENT
Start: 2021-10-27 | End: 2021-10-27

## 2021-10-27 RX ADMIN — KETOROLAC TROMETHAMINE 15 MG: 30 INJECTION, SOLUTION INTRAMUSCULAR at 22:22

## 2021-10-27 RX ADMIN — DIPHENHYDRAMINE HYDROCHLORIDE 25 MG: 50 INJECTION, SOLUTION INTRAMUSCULAR; INTRAVENOUS at 12:03

## 2021-10-27 RX ADMIN — FENTANYL CITRATE 50 MCG: 50 INJECTION INTRAMUSCULAR; INTRAVENOUS at 12:02

## 2021-10-27 RX ADMIN — FENTANYL CITRATE 50 MCG: 0.05 INJECTION, SOLUTION INTRAMUSCULAR; INTRAVENOUS at 13:40

## 2021-10-27 RX ADMIN — DEXAMETHASONE SODIUM PHOSPHATE 10 MG: 10 INJECTION INTRAMUSCULAR; INTRAVENOUS at 15:19

## 2021-10-27 RX ADMIN — PROMETHAZINE HYDROCHLORIDE 12.5 MG: 25 INJECTION INTRAMUSCULAR; INTRAVENOUS at 21:11

## 2021-10-27 RX ADMIN — SODIUM CHLORIDE 1000 ML: 9 INJECTION, SOLUTION INTRAVENOUS at 12:02

## 2021-10-27 RX ADMIN — SODIUM CHLORIDE 3000 MG: 9 INJECTION, SOLUTION INTRAVENOUS at 15:27

## 2021-10-27 RX ADMIN — IOPAMIDOL 75 ML: 755 INJECTION, SOLUTION INTRAVENOUS at 13:29

## 2021-10-27 RX ADMIN — HYDROMORPHONE HYDROCHLORIDE 0.5 MG: 1 INJECTION, SOLUTION INTRAMUSCULAR; INTRAVENOUS; SUBCUTANEOUS at 18:52

## 2021-10-27 RX ADMIN — METOCLOPRAMIDE 10 MG: 5 INJECTION, SOLUTION INTRAMUSCULAR; INTRAVENOUS at 12:13

## 2021-10-27 RX ADMIN — HYDROMORPHONE HYDROCHLORIDE 1 MG: 1 INJECTION, SOLUTION INTRAMUSCULAR; INTRAVENOUS; SUBCUTANEOUS at 15:19

## 2021-10-27 RX ADMIN — AMPICILLIN SODIUM AND SULBACTAM SODIUM 3000 MG: 2; 1 INJECTION, POWDER, FOR SOLUTION INTRAMUSCULAR; INTRAVENOUS at 21:36

## 2021-10-27 ASSESSMENT — PAIN SCALES - GENERAL
PAINLEVEL_OUTOF10: 8
PAINLEVEL_OUTOF10: 8
PAINLEVEL_OUTOF10: 10
PAINLEVEL_OUTOF10: 8
PAINLEVEL_OUTOF10: 10
PAINLEVEL_OUTOF10: 10
PAINLEVEL_OUTOF10: 9

## 2021-10-27 ASSESSMENT — PAIN DESCRIPTION - ORIENTATION
ORIENTATION: RIGHT
ORIENTATION: RIGHT

## 2021-10-27 ASSESSMENT — PAIN DESCRIPTION - PAIN TYPE
TYPE: ACUTE PAIN

## 2021-10-27 ASSESSMENT — PAIN DESCRIPTION - LOCATION
LOCATION: JAW;HEAD
LOCATION: JAW
LOCATION: FACE

## 2021-10-27 ASSESSMENT — PAIN DESCRIPTION - DIRECTION: RADIATING_TOWARDS: EAR

## 2021-10-27 ASSESSMENT — PAIN DESCRIPTION - DESCRIPTORS
DESCRIPTORS: ACHING
DESCRIPTORS: SHARP;THROBBING

## 2021-10-27 ASSESSMENT — PAIN DESCRIPTION - FREQUENCY: FREQUENCY: CONTINUOUS

## 2021-10-27 ASSESSMENT — PAIN DESCRIPTION - ONSET: ONSET: ON-GOING

## 2021-10-27 NOTE — ED PROVIDER NOTES
Department of Emergency Medicine   ED  Provider Note  Admit Date/RoomTime: 10/27/2021 10:29 AM  ED Room: 8289/4550-P          History of Present Illness:  10/27/21, Time: 11:06 AM EDT  Chief Complaint   Patient presents with    Facial Swelling     Patient c/o right jaw pain and facial swelling for the last several days. Patient states she has an abcessed tooth she thinks also c/o migraine like headache caresomelissa called her in clindamycin         Olga Sy is a 40 y.o. female presenting to the ED for facial swelling, beginning several days ago. The complaint has been constant, severe in severity, and worsened by nothing. The patient is a 70-year-old female who presents to the emergency department complaining of right-sided jaw pain. Patient symptoms were sudden in onset several days ago, and has been constant, severe, nothing makes it better or worse. She also complains of a migraine. She called her care source prior to arrival and they prescribed her clindamycin. She states that she just started taking that but it is not helping. She describes her headache as a sharp pain in the back of her head and radiating throughout her head and down her neck. She states that she does have a history of bad teeth in the past and thinks that may be the cause of her symptoms but she states he has not seen a dentist in a very long time. She denies any fever, chills, lightheadedness, dizziness, syncope, nausea, vomiting, diarrhea, abdominal pain, chest pain, shortness of breath, difficulty breathing, difficulty swallowing, hoarse voice, recent dental procedure, recent hospitalization, recent illness, or other acute symptoms or concerns.     Review of Systems:   A complete review of systems was performed and pertinent positives and negatives are stated within HPI, all other systems reviewed and are negative.        --------------------------------------------- PAST HISTORY ---------------------------------------------  Past Medical History:  has a past medical history of Anxiety, Asthma, COPD (chronic obstructive pulmonary disease) ([facility] [address] Ectopic pregnancy, Migraines, Ovarian cyst, and Tobacco abuse. Past Surgical History:  has a past surgical history that includes Ovarian cyst surgery. Social History:  reports that she has been smoking. She has a 2.25 pack-year smoking history. She has never used smokeless tobacco. She reports that she does not drink alcohol and does not use drugs. Family History: family history includes Heart Attack in her father; Heart Disease in her mother; Stroke in her father. . Unless otherwise noted, family history is non contributory    The patients home medications have been reviewed. Allergies: Morphine and Penicillins    I have reviewed the past medical history, past surgical history, social history, and family history    ---------------------------------------------------PHYSICAL EXAM--------------------------------------    Constitutional/General: Alert and oriented x3, ill-appearing and mildly distressed secondary to pain  Head: Normocephalic and atraumatic  Eyes:  EOMI, sclera non icteric  ENT: There is significant swelling over the right mandible with tenderness palpation and overlying erythema. There is trismus. There is no pooling of secretions. There is no swelling of the posterior oropharynx. Uvula is midline. Trachea is midline. Poor dentition and dental caries throughout. She is unable to fully open her mouth for me to evaluate for an abscess. Neck: Supple, full ROM, no stridor, no meningeal signs  Respiratory: Lungs clear to auscultation bilaterally, no wheezes, rales, or rhonchi. Not in respiratory distress  Cardiovascular:  Regular rate. Regular rhythm. No murmurs, no gallops, no rubs. .   Gastrointestinal:  Abdomen Soft, Non tender, Non distended. No rebound, guarding, or rigidity. No pulsatile masses.   Musculoskeletal: Moves all extremities x 4. Warm and well perfused, no clubbing, no cyanosis, no edema. Capillary refill <3 seconds  Skin: skin warm and dry. No rashes. Neurologic: GCS 15, no focal deficits, symmetric strength 5/5 in the upper and lower extremities bilaterally  Psychiatric: Normal Affect          -------------------------------------------------- RESULTS -------------------------------------------------  I have personally reviewed all laboratory and imaging results for this patient. Results are listed below.      LABS: (Lab results interpreted by me)  Results for orders placed or performed during the hospital encounter of 10/27/21   Culture, Blood 2    Specimen: Blood   Result Value Ref Range    Culture, Blood 2 24 Hours no growth    Culture, Blood 1    Specimen: Blood   Result Value Ref Range    Blood Culture, Routine 24 Hours no growth    CBC auto differential   Result Value Ref Range    WBC 19.8 (H) 4.5 - 11.5 E9/L    RBC 4.91 3.50 - 5.50 E12/L    Hemoglobin 14.8 11.5 - 15.5 g/dL    Hematocrit 44.3 34.0 - 48.0 %    MCV 90.2 80.0 - 99.9 fL    MCH 30.1 26.0 - 35.0 pg    MCHC 33.4 32.0 - 34.5 %    RDW 13.2 11.5 - 15.0 fL    Platelets 669 962 - 711 E9/L    MPV 9.9 7.0 - 12.0 fL    Neutrophils % 81.1 (H) 43.0 - 80.0 %    Immature Granulocytes % 0.6 0.0 - 5.0 %    Lymphocytes % 10.2 (L) 20.0 - 42.0 %    Monocytes % 7.4 2.0 - 12.0 %    Eosinophils % 0.4 0.0 - 6.0 %    Basophils % 0.3 0.0 - 2.0 %    Neutrophils Absolute 16.01 (H) 1.80 - 7.30 E9/L    Immature Granulocytes # 0.12 E9/L    Lymphocytes Absolute 2.02 1.50 - 4.00 E9/L    Monocytes Absolute 1.47 (H) 0.10 - 0.95 E9/L    Eosinophils Absolute 0.07 0.05 - 0.50 E9/L    Basophils Absolute 0.06 0.00 - 0.20 E9/L   LACTIC ACID, PLASMA   Result Value Ref Range    Lactic Acid 0.9 0.5 - 2.2 mmol/L   SEDIMENTATION RATE   Result Value Ref Range    Sed Rate 15 0 - 20 mm/Hr   SPECIMEN REJECTION   Result Value Ref Range    Rejected Test bmpx crp     Reason for Rejection see below    Basic metabolic panel   Result Value Ref Range    Sodium 137 132 - 146 mmol/L    Potassium 4.0 3.5 - 5.0 mmol/L    Chloride 101 98 - 107 mmol/L    CO2 23 22 - 29 mmol/L    Anion Gap 13 7 - 16 mmol/L    Glucose 98 74 - 99 mg/dL    BUN 8 6 - 20 mg/dL    CREATININE 0.6 0.5 - 1.0 mg/dL    GFR Non-African American >60 >=60 mL/min/1.73    GFR African American >60     Calcium 9.1 8.6 - 10.2 mg/dL   C-reactive protein   Result Value Ref Range    CRP 3.4 (H) 0.0 - 0.4 mg/dL   Comprehensive Metabolic Panel w/ Reflex to MG   Result Value Ref Range    Sodium 137 132 - 146 mmol/L    Potassium reflex Magnesium 4.0 3.5 - 5.0 mmol/L    Chloride 102 98 - 107 mmol/L    CO2 23 22 - 29 mmol/L    Anion Gap 12 7 - 16 mmol/L    Glucose 98 74 - 99 mg/dL    BUN 8 6 - 20 mg/dL    CREATININE 0.5 0.5 - 1.0 mg/dL    GFR Non-African American >60 >=60 mL/min/1.73    GFR African American >60     Calcium 8.4 (L) 8.6 - 10.2 mg/dL    Total Protein 5.6 (L) 6.4 - 8.3 g/dL    Albumin 3.4 (L) 3.5 - 5.2 g/dL    Total Bilirubin 0.2 0.0 - 1.2 mg/dL    Alkaline Phosphatase 42 35 - 104 U/L    ALT 9 0 - 32 U/L    AST 10 0 - 31 U/L   Comprehensive Metabolic Panel w/ Reflex to MG   Result Value Ref Range    Sodium 138 132 - 146 mmol/L    Potassium reflex Magnesium 4.0 3.5 - 5.0 mmol/L    Chloride 105 98 - 107 mmol/L    CO2 21 (L) 22 - 29 mmol/L    Anion Gap 12 7 - 16 mmol/L    Glucose 87 74 - 99 mg/dL    BUN 8 6 - 20 mg/dL    CREATININE 0.6 0.5 - 1.0 mg/dL    GFR Non-African American >60 >=60 mL/min/1.73    GFR African American >60     Calcium 8.6 8.6 - 10.2 mg/dL    Total Protein 6.3 (L) 6.4 - 8.3 g/dL    Albumin 3.4 (L) 3.5 - 5.2 g/dL    Total Bilirubin 0.3 0.0 - 1.2 mg/dL    Alkaline Phosphatase 52 35 - 104 U/L    ALT 12 0 - 32 U/L    AST 15 0 - 31 U/L   CBC auto differential   Result Value Ref Range    WBC 13.0 (H) 4.5 - 11.5 E9/L    RBC 4.11 3.50 - 5.50 E12/L    Hemoglobin 12.3 11.5 - 15.5 g/dL    Hematocrit 38.7 34.0 - 48.0 %    MCV 94.2 80.0 - 99.9 fL    MCH 29.9 26.0 - 35.0 pg    MCHC 31.8 (L) 32.0 - 34.5 %    RDW 13.6 11.5 - 15.0 fL    Platelets 614 193 - 253 E9/L    MPV 10.7 7.0 - 12.0 fL    Neutrophils % 68.3 43.0 - 80.0 %    Immature Granulocytes % 0.3 0.0 - 5.0 %    Lymphocytes % 22.4 20.0 - 42.0 %    Monocytes % 7.9 2.0 - 12.0 %    Eosinophils % 0.7 0.0 - 6.0 %    Basophils % 0.4 0.0 - 2.0 %    Neutrophils Absolute 8.88 (H) 1.80 - 7.30 E9/L    Immature Granulocytes # 0.04 E9/L    Lymphocytes Absolute 2.92 1.50 - 4.00 E9/L    Monocytes Absolute 1.03 (H) 0.10 - 0.95 E9/L    Eosinophils Absolute 0.09 0.05 - 0.50 E9/L    Basophils Absolute 0.05 0.00 - 0.20 E9/L   ,       RADIOLOGY:  Interpreted by Radiologist unless otherwise specified  CT HEAD WO CONTRAST   Final Result   No acute intracranial abnormality. CT FACIAL BONES W CONTRAST   Final Result   Extensive soft tissue swelling, fascial thickening, and skin thickening   adjacent to the right mandible and in the right cheek region consistent with   cellulitis. A linear fluid collection is demonstrated adjacent to the right   aspect of the mandible representing a phlegmon or early abscess. ------------------------- NURSING NOTES AND VITALS REVIEWED ---------------------------   The nursing notes within the ED encounter and vital signs as below have been reviewed by myself  /66   Pulse 54   Temp 97.3 °F (36.3 °C) (Temporal)   Resp 16   Ht 6' (1.829 m)   Wt 147 lb (66.7 kg)   LMP 10/26/2021   SpO2 98%   BMI 19.94 kg/m²     Oxygen Saturation Interpretation: Normal    The patients available past medical records and past encounters were reviewed.         ------------------------------ ED COURSE/MEDICAL DECISION MAKING----------------------  Medications   ampicillin-sulbactam (UNASYN) 3 (2-1) g injection (  Not Given 10/27/21 1223)   ampicillin-sulbactam (UNASYN) 3000 mg ivpb minibag (3,000 mg IntraVENous Not Given 10/27/21 5804)   sodium chloride flush 0.9 % injection 10 mL (10 mLs IntraVENous Given 10/29/21 0842)   sodium chloride flush 0.9 % injection 10 mL (10 mLs IntraVENous Given 10/29/21 0531)   0.9 % sodium chloride infusion (has no administration in time range)   enoxaparin (LOVENOX) injection 40 mg (40 mg SubCUTAneous Not Given 10/29/21 0834)   promethazine (PHENERGAN) tablet 12.5 mg (12.5 mg Oral Given 10/28/21 0244)     Or   ondansetron (ZOFRAN) injection 4 mg ( IntraVENous See Alternative 10/28/21 0244)   polyethylene glycol (GLYCOLAX) packet 17 g (has no administration in time range)   acetaminophen (TYLENOL) tablet 650 mg (650 mg Oral Given 10/29/21 1247)     Or   acetaminophen (TYLENOL) suppository 650 mg ( Rectal See Alternative 10/29/21 1247)   0.9 % sodium chloride infusion ( IntraVENous Restarted 10/28/21 1515)   ampicillin-sulbactam (UNASYN) 3000 mg ivpb minibag (0 mg IntraVENous Stopped 10/29/21 1315)   HYDROcodone-acetaminophen (NORCO) 7.5-325 MG per tablet 1 tablet (has no administration in time range)   0.9 % sodium chloride bolus (0 mLs IntraVENous Stopped 10/27/21 1258)   metoclopramide (REGLAN) injection 10 mg (10 mg IntraVENous Given 10/27/21 1213)   diphenhydrAMINE (BENADRYL) injection 25 mg (25 mg IntraVENous Given 10/27/21 1203)   fentaNYL (SUBLIMAZE) injection 50 mcg (50 mcg IntraVENous Given 10/27/21 1202)   iopamidol (ISOVUE-370) 76 % injection 75 mL (75 mLs IntraVENous Given 10/27/21 1329)   fentaNYL (SUBLIMAZE) injection 50 mcg (50 mcg IntraVENous Given 10/27/21 1340)   ampicillin-sulbactam (UNASYN) 3000 mg ivpb minibag (0 mg IntraVENous Stopped 10/27/21 1930)   HYDROmorphone (DILAUDID) injection 1 mg (1 mg IntraVENous Given 10/27/21 1519)   dexamethasone (DECADRON) injection 10 mg (10 mg IntraVENous Given 10/27/21 1519)   HYDROmorphone (DILAUDID) injection 0.5 mg (0.5 mg IntraVENous Given 10/27/21 1852)   promethazine (PHENERGAN) injection 12.5 mg (12.5 mg IntraMUSCular Given 10/27/21 2111)   ampicillin-sulbactam (UNASYN) 3 (2-1) g injection (3,000 mg IntraVENous Given 10/27/21 2136)   ketorolac (TORADOL) injection 15 mg (15 mg IntraVENous Given 10/27/21 2222)           The cardiac monitor revealed NSR with a heart rate in the 70s as interpreted by me. The cardiac monitor was ordered secondary to the patient's facial swelling and to monitor the patient for dysrhythmia. CPT [id number]       I, Dr. Estrellita Urban, am the primary provider of record    Medical Decision Making:   The patient is a 80-year-old female who presents to the emergency department complaining of facial swelling. She is hemodynamically stable, nontoxic in appearance, and in no acute distress. There is no evidence of airway compromise. But she does have significant swelling and erythema over the right mandible. Did obtain a CT with contrast which did show evidence of cellulitis of the fascial tissues and early abscess formation. She has been on clindamycin at home but is not getting any better with outpatient antibiotics. I suspect this may be from an intraoral infection due to her significant dental caries, but she is unable to fully open her mouth for me to evaluate for an abscess due to significant pain. She was on multiple rounds of pain medications along with Unasyn and Decadron. She was also given a headache cocktail and CT of her head was negative. I suspect that her headache is from the infection of her mandible. Discussed results and plan with patient and cultures were sent prior to initiation of antibiotics. She is agreeable to admission. Oxygen Saturation Interpretation: 98 % on room air. Re-Evaluations:  ED Course as of Oct 29 1554   Wed Oct 27, 2021   1236 Patient is resting comfortably and in no acute distress. She states that her pain is improving. [KG]   1840 Patient excepted for transfer to [facility] (1-)  Patient accepted in signout for transfer to [facility] (1-) for facial cellulitis and likely mandibular abscess.   Patient was stable in emergency department and there is no concern for airway compromise. Patient is tolerating her own secretions patient did have trismus and was given additional dose of pain medication emergency department for significant cellulitis. Patient was accepted by [facility] for admission to [facility] (1-[facility])     [SS]      ED Course User Index  [KG] Marilyn Lucero DO  [SS] Caleb Cohen MD             This patient's ED course included: a personal history and physicial examination, re-evaluation prior to disposition, multiple bedside re-evaluations, IV medications, cardiac monitoring, continuous pulse oximetry and complex medical decision making and emergency management    This patient has remained hemodynamically stable during their ED course. Consultations:  Spoke with hospitalist (Medicine). Discussed case. They will admit this patient. Counseling: The emergency provider has spoken with the patient and discussed todays results, in addition to providing specific details for the plan of care and counseling regarding the diagnosis and prognosis. Questions are answered at this time and they are agreeable with the plan.       --------------------------------- IMPRESSION AND DISPOSITION ---------------------------------    IMPRESSION  1. Facial cellulitis    2. Abscess    3. Leukocytosis, unspecified type        DISPOSITION  Disposition: Admit to med/surg floor  Patient condition is stable        NOTE: This report was transcribed using voice recognition software.  Every effort was made to ensure accuracy; however, inadvertent computerized transcription errors may be present       Marilyn Lucero DO  10/29/21 1553

## 2021-10-27 NOTE — ED NOTES
Called [facility] and pt placed on will call for [facility] [facility]  10/27/21 1919

## 2021-10-28 PROBLEM — L02.01 FACIAL ABSCESS: Status: ACTIVE | Noted: 2021-10-28

## 2021-10-28 LAB
ALBUMIN SERPL-MCNC: 3.4 G/DL (ref 3.5–5.2)
ALP BLD-CCNC: 42 U/L (ref 35–104)
ALT SERPL-CCNC: 9 U/L (ref 0–32)
ANION GAP SERPL CALCULATED.3IONS-SCNC: 12 MMOL/L (ref 7–16)
AST SERPL-CCNC: 10 U/L (ref 0–31)
BILIRUB SERPL-MCNC: 0.2 MG/DL (ref 0–1.2)
BUN BLDV-MCNC: 8 MG/DL (ref 6–20)
CALCIUM SERPL-MCNC: 8.4 MG/DL (ref 8.6–10.2)
CHLORIDE BLD-SCNC: 102 MMOL/L (ref 98–107)
CO2: 23 MMOL/L (ref 22–29)
CREAT SERPL-MCNC: 0.5 MG/DL (ref 0.5–1)
GFR AFRICAN AMERICAN: >60
GFR NON-AFRICAN AMERICAN: >60 ML/MIN/1.73
GLUCOSE BLD-MCNC: 98 MG/DL (ref 74–99)
POTASSIUM REFLEX MAGNESIUM: 4 MMOL/L (ref 3.5–5)
SODIUM BLD-SCNC: 137 MMOL/L (ref 132–146)
TOTAL PROTEIN: 5.6 G/DL (ref 6.4–8.3)

## 2021-10-28 PROCEDURE — 80053 COMPREHEN METABOLIC PANEL: CPT

## 2021-10-28 PROCEDURE — 1200000000 HC SEMI PRIVATE

## 2021-10-28 PROCEDURE — 6370000000 HC RX 637 (ALT 250 FOR IP): Performed by: FAMILY MEDICINE

## 2021-10-28 PROCEDURE — 36415 COLL VENOUS BLD VENIPUNCTURE: CPT

## 2021-10-28 PROCEDURE — 2580000003 HC RX 258: Performed by: FAMILY MEDICINE

## 2021-10-28 PROCEDURE — 6360000002 HC RX W HCPCS: Performed by: FAMILY MEDICINE

## 2021-10-28 RX ORDER — SODIUM CHLORIDE 0.9 % (FLUSH) 0.9 %
10 SYRINGE (ML) INJECTION PRN
Status: DISCONTINUED | OUTPATIENT
Start: 2021-10-28 | End: 2021-10-30 | Stop reason: HOSPADM

## 2021-10-28 RX ORDER — HYDROCODONE BITARTRATE AND ACETAMINOPHEN 5; 325 MG/1; MG/1
1 TABLET ORAL EVERY 6 HOURS PRN
Status: DISCONTINUED | OUTPATIENT
Start: 2021-10-28 | End: 2021-10-29

## 2021-10-28 RX ORDER — FENTANYL CITRATE 50 UG/ML
25 INJECTION, SOLUTION INTRAMUSCULAR; INTRAVENOUS
Status: DISCONTINUED | OUTPATIENT
Start: 2021-10-28 | End: 2021-10-29

## 2021-10-28 RX ORDER — SODIUM CHLORIDE 0.9 % (FLUSH) 0.9 %
10 SYRINGE (ML) INJECTION EVERY 12 HOURS SCHEDULED
Status: DISCONTINUED | OUTPATIENT
Start: 2021-10-28 | End: 2021-10-30 | Stop reason: HOSPADM

## 2021-10-28 RX ORDER — ACETAMINOPHEN 650 MG/1
650 SUPPOSITORY RECTAL EVERY 6 HOURS PRN
Status: DISCONTINUED | OUTPATIENT
Start: 2021-10-28 | End: 2021-10-30 | Stop reason: HOSPADM

## 2021-10-28 RX ORDER — PROMETHAZINE HYDROCHLORIDE 25 MG/1
12.5 TABLET ORAL EVERY 6 HOURS PRN
Status: DISCONTINUED | OUTPATIENT
Start: 2021-10-28 | End: 2021-10-30 | Stop reason: HOSPADM

## 2021-10-28 RX ORDER — SODIUM CHLORIDE 9 MG/ML
INJECTION, SOLUTION INTRAVENOUS CONTINUOUS
Status: DISCONTINUED | OUTPATIENT
Start: 2021-10-28 | End: 2021-10-30 | Stop reason: HOSPADM

## 2021-10-28 RX ORDER — POLYETHYLENE GLYCOL 3350 17 G/17G
17 POWDER, FOR SOLUTION ORAL DAILY PRN
Status: DISCONTINUED | OUTPATIENT
Start: 2021-10-28 | End: 2021-10-30 | Stop reason: HOSPADM

## 2021-10-28 RX ORDER — ONDANSETRON 2 MG/ML
4 INJECTION INTRAMUSCULAR; INTRAVENOUS EVERY 6 HOURS PRN
Status: DISCONTINUED | OUTPATIENT
Start: 2021-10-28 | End: 2021-10-30 | Stop reason: HOSPADM

## 2021-10-28 RX ORDER — ACETAMINOPHEN 325 MG/1
650 TABLET ORAL EVERY 6 HOURS PRN
Status: DISCONTINUED | OUTPATIENT
Start: 2021-10-28 | End: 2021-10-30 | Stop reason: HOSPADM

## 2021-10-28 RX ORDER — SODIUM CHLORIDE 9 MG/ML
25 INJECTION, SOLUTION INTRAVENOUS PRN
Status: DISCONTINUED | OUTPATIENT
Start: 2021-10-28 | End: 2021-10-30 | Stop reason: HOSPADM

## 2021-10-28 RX ADMIN — SODIUM CHLORIDE 3000 MG: 900 INJECTION INTRAVENOUS at 10:46

## 2021-10-28 RX ADMIN — SODIUM CHLORIDE 3000 MG: 900 INJECTION INTRAVENOUS at 03:49

## 2021-10-28 RX ADMIN — FENTANYL CITRATE 25 MCG: 0.05 INJECTION, SOLUTION INTRAMUSCULAR; INTRAVENOUS at 13:02

## 2021-10-28 RX ADMIN — FENTANYL CITRATE 25 MCG: 0.05 INJECTION, SOLUTION INTRAMUSCULAR; INTRAVENOUS at 06:35

## 2021-10-28 RX ADMIN — SODIUM CHLORIDE 3000 MG: 900 INJECTION INTRAVENOUS at 15:40

## 2021-10-28 RX ADMIN — FENTANYL CITRATE 25 MCG: 0.05 INJECTION, SOLUTION INTRAMUSCULAR; INTRAVENOUS at 08:43

## 2021-10-28 RX ADMIN — HYDROCODONE BITARTRATE AND ACETAMINOPHEN 1 TABLET: 5; 325 TABLET ORAL at 02:44

## 2021-10-28 RX ADMIN — HYDROCODONE BITARTRATE AND ACETAMINOPHEN 1 TABLET: 5; 325 TABLET ORAL at 21:30

## 2021-10-28 RX ADMIN — FENTANYL CITRATE 25 MCG: 0.05 INJECTION, SOLUTION INTRAMUSCULAR; INTRAVENOUS at 17:40

## 2021-10-28 RX ADMIN — ACETAMINOPHEN 650 MG: 325 TABLET ORAL at 22:57

## 2021-10-28 RX ADMIN — SODIUM CHLORIDE: 9 INJECTION, SOLUTION INTRAVENOUS at 02:43

## 2021-10-28 RX ADMIN — PROMETHAZINE HYDROCHLORIDE 12.5 MG: 25 TABLET ORAL at 02:44

## 2021-10-28 ASSESSMENT — PAIN DESCRIPTION - LOCATION
LOCATION: JAW

## 2021-10-28 ASSESSMENT — PAIN DESCRIPTION - PAIN TYPE
TYPE: ACUTE PAIN
TYPE: ACUTE PAIN;SURGICAL PAIN
TYPE: ACUTE PAIN
TYPE: ACUTE PAIN
TYPE: ACUTE PAIN;SURGICAL PAIN

## 2021-10-28 ASSESSMENT — PAIN DESCRIPTION - DESCRIPTORS
DESCRIPTORS: ACHING;CONSTANT;DISCOMFORT
DESCRIPTORS: ACHING;CONSTANT;DISCOMFORT
DESCRIPTORS: DISCOMFORT;SHARP;THROBBING
DESCRIPTORS: SHARP;THROBBING

## 2021-10-28 ASSESSMENT — PAIN SCALES - GENERAL
PAINLEVEL_OUTOF10: 8
PAINLEVEL_OUTOF10: 7
PAINLEVEL_OUTOF10: 1
PAINLEVEL_OUTOF10: 10
PAINLEVEL_OUTOF10: 4
PAINLEVEL_OUTOF10: 8
PAINLEVEL_OUTOF10: 7
PAINLEVEL_OUTOF10: 5
PAINLEVEL_OUTOF10: 8
PAINLEVEL_OUTOF10: 5
PAINLEVEL_OUTOF10: 3
PAINLEVEL_OUTOF10: 9

## 2021-10-28 ASSESSMENT — PAIN DESCRIPTION - DIRECTION: RADIATING_TOWARDS: EAR

## 2021-10-28 ASSESSMENT — PAIN DESCRIPTION - FREQUENCY
FREQUENCY: CONTINUOUS
FREQUENCY: CONTINUOUS

## 2021-10-28 ASSESSMENT — PAIN DESCRIPTION - ONSET: ONSET: ON-GOING

## 2021-10-28 ASSESSMENT — PAIN DESCRIPTION - ORIENTATION
ORIENTATION: RIGHT

## 2021-10-28 NOTE — H&P
Hospitalist History & Physical      PCP: No primary care provider on file. Date of Admission: 10/27/2021    Date of Service: Pt seen/examined on 10/27/2021     Chief Complaint:  had concerns including Facial Swelling (Patient c/o right jaw pain and facial swelling for the last several days. Patient states she has an abcessed tooth she thinks also c/o migraine like headache caresource called her in clindamycin ). History Of Present Illness:    Ms. Maryam Christie, a 40y.o. year old female  who  has a past medical history of Anxiety, Asthma, COPD (chronic obstructive pulmonary disease) ([facility] [address] Ectopic pregnancy, Migraines, Ovarian cyst, and Tobacco abuse. Patient was seen at [facility] emergency department with complaints of right jaw pain and facial swelling for the last several days. Patient thinks she has an abscessed tooth. She also has headache. She has been taking clindamycin without much effect. She denies fever, chills, nausea, vomiting, diarrhea, chest pain, shortness of breath. Vital signs within normal limits and stable. Laboratory studies notable for CRP 3.4, WBC 19.8. CT facial bones shows extensive soft tissue swelling, fascial thickening and skin thickening adjacent to the right mandible in the right cheek region consistent with cellulitis. A linear fluid collection is demonstrated adjacent to the right aspect of the mandible representing a phlegmon or early abscess. Patient was started on Unasyn and transferred to [facility] for further evaluation and treatment. Past Medical History:   Diagnosis Date    Anxiety     Asthma     COPD (chronic obstructive pulmonary disease) ([facility] [address]     told a few weeks ago at [facility] Ectopic pregnancy     Migraines     Ovarian cyst     Tobacco abuse        Past Surgical History:   Procedure Laterality Date    OVARIAN CYST SURGERY         Prior to Admission medications    Medication Sig Start Date End Date Taking?  Authorizing Provider   clindamycin (CLEOCIN) 300 MG capsule Take 1 capsule by mouth 3 times daily 10/26/21  Yes Historical Provider, MD   ibuprofen (ADVIL;MOTRIN) 800 MG tablet Take 1 tablet by mouth 3 times daily as needed 10/26/21  Yes Historical Provider, MD         Allergies:  Morphine and Penicillins    Social History:    TOBACCO:   reports that she has been smoking. She has a 2.25 pack-year smoking history. She has never used smokeless tobacco.  ETOH:   reports no history of alcohol use. Family History:    Reviewed in detail and negative for DM, CAD, Cancer, CVA. Positive as follows\"      Problem Relation Age of Onset    Heart Disease Mother     Stroke Father     Heart Attack Father        REVIEW OF SYSTEMS:   Pertinent positives as noted in the HPI. All other systems reviewed and negative. PHYSICAL EXAM:  BP (!) 109/58   Pulse 59   Temp 99.6 °F (37.6 °C) (Tympanic)   Resp 18   Ht 6' (1.829 m)   Wt 147 lb (66.7 kg)   LMP 10/26/2021   SpO2 99%   BMI 19.94 kg/m²   General appearance: No apparent distress, appears stated age and cooperative. HEENT: Normal cephalic, atraumatic without obvious deformity. EOMI. Swelling over the right mandible, tender to palpation, erythematous. Neck: Supple, with full range of motion. No jugular venous distention. Trachea midline. Respiratory: Clear to auscultation bilaterally regular rate and rhythm  Cardiovascular:    Abdomen: Soft, nontender, nondistended  Musculoskeletal: No clubbing, cyanosis, edema of bilateral lower extremities. Brisk capillary refill. Skin: Normal skin color. No rashes or lesions. Neurologic:  Neurovascularly intact without any focal sensory/motor deficits.  Cranial nerves: II-XII intact, grossly non-focal.        CBC:   Recent Labs     10/27/21  1139   WBC 19.8*   RBC 4.91   HGB 14.8   HCT 44.3   MCV 90.2   RDW 13.2        BMP:   Recent Labs     10/27/21  1237      K 4.0      CO2 23   BUN 8   CREATININE 0.6     LFT:  No mandible representing a phlegmon or early abscess. BRAIN/ORBITS/SINUSES: The visualized portion of the intracranial contents appear unremarkable. The visualized portion of the orbits, paranasal sinuses and mastoid air cells demonstrate no acute abnormality. BONES:  There are multiple dental caries. Extensive soft tissue swelling, fascial thickening, and skin thickening adjacent to the right mandible and in the right cheek region consistent with cellulitis. A linear fluid collection is demonstrated adjacent to the right aspect of the mandible representing a phlegmon or early abscess. ASSESSMENT:  -Facial cellulitis  -Abscess versus phlegmon  -Leukocytosis      PLAN:  -Admit to medicine  -Consult ENT  -Unasyn 3000 mg every 6 hours  -Monitor cultures  -Pain management        Diet: ADULT DIET; Regular  Code Status: Full Code  Surrogate decision maker confirmed with patient:   Extended Emergency Contact Information  Primary Emergency Contact: Corbysteven,[place]  Address: [address] [place] [address], [address] [place] [address] Phone: [phone]  Mobile Phone: [phone]  Relation: Parent   needed? No    DVT Prophylaxis: []Lovenox []Heparin []PCD [] [address] []Encouraged ambulation  Disposition: []Med/Surg [] Intermediate [] ICU/CCU  Admit status: [] Observation [] Inpatient     +++++++++++++++++++++++++++++++++++++++++++++++++  Marielos Mosquera DO  [address]  +++++++++++++++++++++++++++++++++++++++++++++++++  NOTE: This report was transcribed using voice recognition software. Every effort was made to ensure accuracy; however, inadvertent computerized transcription errors may be present.

## 2021-10-28 NOTE — CONSULTS
Off floor for dental clinic extraction and I and D. Ct reviewed, no airway involvement and no fulminant abscess. Can follow up as outpatient from ENT standpoint.

## 2021-10-28 NOTE — PLAN OF CARE
Problem: Pain:  Goal: Pain level will decrease  Description: Pain level will decrease  10/28/2021 1614 by Lele Rodriguez RN  Outcome: Met This Shift  10/28/2021 1052 by Lele Rodriguez RN  Outcome: Met This Shift  Goal: Control of acute pain  Description: Control of acute pain  10/28/2021 1614 by Lele Rodriguez RN  Outcome: Met This Shift  10/28/2021 1052 by Lele Rodriguez RN  Outcome: Met This Shift  Goal: Control of chronic pain  Description: Control of chronic pain  10/28/2021 1614 by Lele Rodriguez RN  Outcome: Met This Shift  10/28/2021 1052 by Lele Rodriguez RN  Outcome: Met This Shift

## 2021-10-28 NOTE — PROGRESS NOTES
Otolaryngology consult sent to resident  Electronically signed by Rohan Gonzalez RN on 10/28/2021 at 8:02 AM

## 2021-10-28 NOTE — PROGRESS NOTES
Ms Fabián Berger is 40 YF with right sided facial swelling due to infected dental caries per ENT; consult to dental placed; patient states pain is controlled with medications.

## 2021-10-29 LAB
ALBUMIN SERPL-MCNC: 3.4 G/DL (ref 3.5–5.2)
ALP BLD-CCNC: 52 U/L (ref 35–104)
ALT SERPL-CCNC: 12 U/L (ref 0–32)
ANION GAP SERPL CALCULATED.3IONS-SCNC: 12 MMOL/L (ref 7–16)
AST SERPL-CCNC: 15 U/L (ref 0–31)
BASOPHILS ABSOLUTE: 0.05 E9/L (ref 0–0.2)
BASOPHILS RELATIVE PERCENT: 0.4 % (ref 0–2)
BILIRUB SERPL-MCNC: 0.3 MG/DL (ref 0–1.2)
BUN BLDV-MCNC: 8 MG/DL (ref 6–20)
CALCIUM SERPL-MCNC: 8.6 MG/DL (ref 8.6–10.2)
CHLORIDE BLD-SCNC: 105 MMOL/L (ref 98–107)
CO2: 21 MMOL/L (ref 22–29)
CREAT SERPL-MCNC: 0.6 MG/DL (ref 0.5–1)
EOSINOPHILS ABSOLUTE: 0.09 E9/L (ref 0.05–0.5)
EOSINOPHILS RELATIVE PERCENT: 0.7 % (ref 0–6)
GFR AFRICAN AMERICAN: >60
GFR NON-AFRICAN AMERICAN: >60 ML/MIN/1.73
GLUCOSE BLD-MCNC: 87 MG/DL (ref 74–99)
HCT VFR BLD CALC: 38.7 % (ref 34–48)
HEMOGLOBIN: 12.3 G/DL (ref 11.5–15.5)
IMMATURE GRANULOCYTES #: 0.04 E9/L
IMMATURE GRANULOCYTES %: 0.3 % (ref 0–5)
LYMPHOCYTES ABSOLUTE: 2.92 E9/L (ref 1.5–4)
LYMPHOCYTES RELATIVE PERCENT: 22.4 % (ref 20–42)
MCH RBC QN AUTO: 29.9 PG (ref 26–35)
MCHC RBC AUTO-ENTMCNC: 31.8 % (ref 32–34.5)
MCV RBC AUTO: 94.2 FL (ref 80–99.9)
MONOCYTES ABSOLUTE: 1.03 E9/L (ref 0.1–0.95)
MONOCYTES RELATIVE PERCENT: 7.9 % (ref 2–12)
NEUTROPHILS ABSOLUTE: 8.88 E9/L (ref 1.8–7.3)
NEUTROPHILS RELATIVE PERCENT: 68.3 % (ref 43–80)
PDW BLD-RTO: 13.6 FL (ref 11.5–15)
PLATELET # BLD: 237 E9/L (ref 130–450)
PMV BLD AUTO: 10.7 FL (ref 7–12)
POTASSIUM REFLEX MAGNESIUM: 4 MMOL/L (ref 3.5–5)
RBC # BLD: 4.11 E12/L (ref 3.5–5.5)
SODIUM BLD-SCNC: 138 MMOL/L (ref 132–146)
TOTAL PROTEIN: 6.3 G/DL (ref 6.4–8.3)
WBC # BLD: 13 E9/L (ref 4.5–11.5)

## 2021-10-29 PROCEDURE — 80053 COMPREHEN METABOLIC PANEL: CPT

## 2021-10-29 PROCEDURE — 36415 COLL VENOUS BLD VENIPUNCTURE: CPT

## 2021-10-29 PROCEDURE — 85025 COMPLETE CBC W/AUTO DIFF WBC: CPT

## 2021-10-29 PROCEDURE — 6370000000 HC RX 637 (ALT 250 FOR IP): Performed by: HOSPITALIST

## 2021-10-29 PROCEDURE — 6370000000 HC RX 637 (ALT 250 FOR IP): Performed by: FAMILY MEDICINE

## 2021-10-29 PROCEDURE — 1200000000 HC SEMI PRIVATE

## 2021-10-29 PROCEDURE — 0C940ZZ DRAINAGE OF BUCCAL MUCOSA, OPEN APPROACH: ICD-10-PCS | Performed by: ORAL & MAXILLOFACIAL SURGERY

## 2021-10-29 PROCEDURE — 0CDXXZ1 EXTRACTION OF LOWER TOOTH, MULTIPLE, EXTERNAL APPROACH: ICD-10-PCS | Performed by: ORAL & MAXILLOFACIAL SURGERY

## 2021-10-29 PROCEDURE — 2580000003 HC RX 258: Performed by: FAMILY MEDICINE

## 2021-10-29 PROCEDURE — 6360000002 HC RX W HCPCS: Performed by: FAMILY MEDICINE

## 2021-10-29 RX ORDER — HYDROCODONE BITARTRATE AND ACETAMINOPHEN 7.5; 325 MG/1; MG/1
1 TABLET ORAL EVERY 6 HOURS PRN
Status: DISCONTINUED | OUTPATIENT
Start: 2021-10-29 | End: 2021-10-30 | Stop reason: HOSPADM

## 2021-10-29 RX ADMIN — HYDROCODONE BITARTRATE AND ACETAMINOPHEN 1 TABLET: 5; 325 TABLET ORAL at 04:15

## 2021-10-29 RX ADMIN — Medication 10 ML: at 08:42

## 2021-10-29 RX ADMIN — HYDROCODONE BITARTRATE AND ACETAMINOPHEN 1 TABLET: 5; 325 TABLET ORAL at 10:51

## 2021-10-29 RX ADMIN — SODIUM CHLORIDE 3000 MG: 900 INJECTION INTRAVENOUS at 00:21

## 2021-10-29 RX ADMIN — ACETAMINOPHEN 650 MG: 325 TABLET ORAL at 20:44

## 2021-10-29 RX ADMIN — ONDANSETRON 4 MG: 2 INJECTION INTRAMUSCULAR; INTRAVENOUS at 23:19

## 2021-10-29 RX ADMIN — SODIUM CHLORIDE 3000 MG: 900 INJECTION INTRAVENOUS at 18:07

## 2021-10-29 RX ADMIN — ACETAMINOPHEN 650 MG: 325 TABLET ORAL at 12:47

## 2021-10-29 RX ADMIN — SODIUM CHLORIDE, PRESERVATIVE FREE 10 ML: 5 INJECTION INTRAVENOUS at 05:31

## 2021-10-29 RX ADMIN — SODIUM CHLORIDE 3000 MG: 900 INJECTION INTRAVENOUS at 23:13

## 2021-10-29 RX ADMIN — FENTANYL CITRATE 25 MCG: 0.05 INJECTION, SOLUTION INTRAMUSCULAR; INTRAVENOUS at 05:31

## 2021-10-29 RX ADMIN — FENTANYL CITRATE 25 MCG: 0.05 INJECTION, SOLUTION INTRAMUSCULAR; INTRAVENOUS at 00:22

## 2021-10-29 RX ADMIN — SODIUM CHLORIDE 3000 MG: 900 INJECTION INTRAVENOUS at 06:07

## 2021-10-29 RX ADMIN — SODIUM CHLORIDE 3000 MG: 900 INJECTION INTRAVENOUS at 12:47

## 2021-10-29 RX ADMIN — HYDROCODONE BITARTRATE AND ACETAMINOPHEN 1 TABLET: 7.5; 325 TABLET ORAL at 17:48

## 2021-10-29 RX ADMIN — FENTANYL CITRATE 25 MCG: 0.05 INJECTION, SOLUTION INTRAMUSCULAR; INTRAVENOUS at 08:39

## 2021-10-29 ASSESSMENT — PAIN SCALES - GENERAL
PAINLEVEL_OUTOF10: 0
PAINLEVEL_OUTOF10: 10
PAINLEVEL_OUTOF10: 2
PAINLEVEL_OUTOF10: 10
PAINLEVEL_OUTOF10: 0
PAINLEVEL_OUTOF10: 0
PAINLEVEL_OUTOF10: 10
PAINLEVEL_OUTOF10: 0
PAINLEVEL_OUTOF10: 8
PAINLEVEL_OUTOF10: 0
PAINLEVEL_OUTOF10: 4
PAINLEVEL_OUTOF10: 3
PAINLEVEL_OUTOF10: 9
PAINLEVEL_OUTOF10: 5
PAINLEVEL_OUTOF10: 7
PAINLEVEL_OUTOF10: 4

## 2021-10-29 ASSESSMENT — PAIN DESCRIPTION - ORIENTATION
ORIENTATION: RIGHT
ORIENTATION: RIGHT

## 2021-10-29 ASSESSMENT — PAIN DESCRIPTION - PROGRESSION: CLINICAL_PROGRESSION: GRADUALLY IMPROVING

## 2021-10-29 ASSESSMENT — PAIN DESCRIPTION - DESCRIPTORS: DESCRIPTORS: ACHING;CONSTANT;DISCOMFORT

## 2021-10-29 ASSESSMENT — PAIN DESCRIPTION - PAIN TYPE
TYPE: ACUTE PAIN;SURGICAL PAIN
TYPE: ACUTE PAIN;SURGICAL PAIN

## 2021-10-29 ASSESSMENT — PAIN DESCRIPTION - LOCATION
LOCATION: JAW
LOCATION: JAW

## 2021-10-29 NOTE — CONSULTS
ConsultationNote    Patient's Name/Date of Birth: Stan Record / 1975 (40 y.o.)    Date: October 29, 2021     Reason for Consult:  Swelling buccal to teeth #29 and #30    Requesting Physician:  Ani Arias MD    HPI  Pt states swelling began around 24 hours ago    Past Medical History:   Diagnosis Date    Anxiety     Asthma     COPD (chronic obstructive pulmonary disease) ([address]     told a few weeks ago at [facility] Ectopic pregnancy     Migraines     Ovarian cyst     Tobacco abuse        Past Surgical History:   Procedure Laterality Date    OVARIAN CYST SURGERY         Prior to Admission medications    Medication Sig Start Date End Date Taking?  Authorizing Provider   clindamycin (CLEOCIN) 300 MG capsule Take 1 capsule by mouth 3 times daily 10/26/21  Yes Historical Provider, MD   ibuprofen (ADVIL;MOTRIN) 800 MG tablet Take 1 tablet by mouth 3 times daily as needed 10/26/21  Yes Historical Provider, MD       Allergies   Allergen Reactions    Morphine Other (See Comments)     Hr dropped    Penicillins Hives       Family History   Problem Relation Age of Onset    Heart Disease Mother     Stroke Father     Heart Attack Father        Social History     Socioeconomic History    Marital status: Single     Spouse name: Not on file    Number of children: Not on file    Years of education: Not on file    Highest education level: Not on file   Occupational History    Not on file   Tobacco Use    Smoking status: Current Every Day Smoker     Packs/day: 0.25     Years: 9.00     Pack years: 2.25    Smokeless tobacco: Never Used    Tobacco comment: quit 3 months ago   Vaping Use    Vaping Use: Never used   Substance and Sexual Activity    Alcohol use: No    Drug use: No    Sexual activity: Not on file   Other Topics Concern    Not on file   Social History Narrative    Not on file     Social Determinants of Health     Financial Resource Strain:     Difficulty of Paying Living Expenses: Food Insecurity:     Worried About Running Out of Food in the Last Year:     [address] in the Last Year:    Transportation Needs:     Lack of Transportation (Medical):  Lack of Transportation (Non-Medical):    Physical Activity:     Days of Exercise per Week:     Minutes of Exercise per Session:    Stress:     Feeling of Stress :    Social Connections:     Frequency of Communication with Friends and Family:     Frequency of Social Gatherings with Friends and Family:     Attends Anabaptism Services:     Active Member of Clubs or Organizations:     Attends Club or Organization Meetings:     Marital Status:    Intimate Partner Violence:     Fear of Current or Ex-Partner:     Emotionally Abused:     Physically Abused:     Sexually Abused:        Review of Systems      Vitals:    10/28/21 0843 10/28/21 1510 10/29/21 0000 10/29/21 0750   BP: (!) 125/59 124/62 (!) 112/52 122/67   Pulse: 61 63 62 97   Resp: 17 17 16 16   Temp: 98.9 °F (37.2 °C) 97.9 °F (36.6 °C) 97.8 °F (36.6 °C) 97.7 °F (36.5 °C)   TempSrc: Temporal Temporal Temporal Tympanic   SpO2: 97% 97%     Weight:       Height:         Physical Exam  40 y.o. female presents with facial swelling buccal to teeth #29 and #30. Tender to palpation and purulent drainage noted. Assessment:  Active Problems:    Facial abscess  Resolved Problems:    * No resolved hospital problems. *      Plan:  Local anesthesia achieved with 2 carpules 0.5% marcaine 1:50k epi, 2 carpules of 4% septocaine 1:100k epi and 2 carpules of 2% lidocaine 1:100k epi. Elevated and used appropriate instruments from extraction #27-#30  Extraction of teeth #27-#30 completed. Chromic gut sutures placed over extraction sites. Buccal vestibular incision made adjacent to teeth #29 and #30. Penrose drain placed and secured with silk sutures.   Pt to return in 1 week for follow up and suture removal.  Rx written for peridex, medrol dose dhara, and clindamycin 150mg.       Electronically signed by Fred Goff DMD on 10/29/21 at 2:17 PM EDT

## 2021-10-29 NOTE — CARE COORDINATION
Discharge plan is to return home when medically stable. Naima Lr RN   Spoke to dental clinic. The pt had 4 teeth extracted 10/28 and a drain was placed. The dr is going to plac discharge orders in [facility]. Ok to dc from dental pov.  The pt is to follow up in the dental clinic  In one week for follow up and drain removal. Naima Lr RN

## 2021-10-29 NOTE — PROGRESS NOTES
HOSPITALIST PROGRESS NOTE  Date: 10/29/2021   Name: Jenni Henry   MRN: [id number]   YOB: 1975        Hospital Course:   Ms Martha Baum is 40 YF Patient was seen at [facility] emergency department with complaints of right jaw pain and facial swelling for the last several days. Patient thinks she has an abscessed tooth. She also has headache. She has been taking clindamycin without much effect. She denies fever, chills, nausea, vomiting, diarrhea, chest pain, shortness of breath. Vital signs within normal limits and stable. Laboratory studies notable for CRP 3.4, WBC 19.8. CT facial bones shows extensive soft tissue swelling, fascial thickening and skin thickening adjacent to the right mandible in the right cheek region consistent with cellulitis.     Subjective/Interval Hx:   Patient had extraction done by the dental clinic due to abscess tooth is on IV antibiotics states she feels a lot better we will continue to monitor    Objective:   Physical Exam:   /67   Pulse 97   Temp 97.7 °F (36.5 °C) (Tympanic)   Resp 16   Ht 6' (1.829 m)   Wt 147 lb (66.7 kg)   LMP 10/26/2021   SpO2 97%   BMI 19.94 kg/m²   General: no acute distress, well nourished and well hydrated  HEENT: NCAT  Heart: S1S2 RRR  Lungs: Clear to ascultation bilaterally, respiratory effort normal  Abdomen: soft, NT/ND, positive bowel sounds  Extremities: no pitting edema, nontender   Neuro: patient is awake, alert and orientated times 3, no gross deficits  Skin: no rashes or ecchymosis        Meds:   Meds:    sodium chloride flush  10 mL IntraVENous 2 times per day    enoxaparin  40 mg SubCUTAneous Daily    ampicillin-sulbactam  3,000 mg IntraVENous Q6H    ampicillin-sulbactam  3,000 mg IntraVENous Once      Infusions:    sodium chloride      sodium chloride 75 mL/hr at 10/28/21 1515     PRN Meds: HYDROcodone-acetaminophen, 1 tablet, Q6H PRN  sodium chloride flush, 10 mL, PRN  sodium chloride, 25 mL, PRN  promethazine, 12.5 mg, Q6H PRN   Or  ondansetron, 4 mg, Q6H PRN  polyethylene glycol, 17 g, Daily PRN  acetaminophen, 650 mg, Q6H PRN   Or  acetaminophen, 650 mg, Q6H PRN        Data/Labs:     Recent Labs     10/27/21  1139 10/29/21  0607   WBC 19.8* 13.0*   HGB 14.8 12.3   HCT 44.3 38.7    237      Recent Labs     10/27/21  1237 10/28/21  0531 10/29/21  0607    137 138   K 4.0 4.0 4.0    102 105   CO2 23 23 21*   BUN 8 8 8   CREATININE 0.6 0.5 0.6     Recent Labs     10/28/21  0531 10/29/21  0607   AST 10 15   ALT 9 12   BILITOT 0.2 0.3   ALKPHOS 42 52     No results for input(s): INR in the last 72 hours. No results for input(s): CKTOTAL, CKMB, CKMBINDEX, TROPONINT in the last 72 hours. I/O last 3 completed shifts: In: 403 [P.O.:120; I.V.:775; IV Piggyback:100]  Out: -     Intake/Output Summary (Last 24 hours) at 10/29/2021 1313  Last data filed at 10/29/2021 0531  Gross per 24 hour   Intake 10 ml   Output --   Net 10 ml        Assessment/Plan:   1. Acute dental caries with abscess status post extraction hypertension continue an incision and debridement-no airway involvement on the CT no fulminant abscess, patient continues on IV antibiotics pain is controlled with medication  2. Acute leukocytosis probably due to the infection-we will continue to monitor lab studies is declining and trending down  3. COPD with tobacco dependency-patient denies having a patch at this time  4. Chronic headaches-ibuprofen as needed      DVT Prophylaxis: Lovenox  Diet: ADULT DIET;  Regular  Code Status: Full Code    Dispo: when stable    Electronically signed by Randa Vu MD on 10/29/2021 at 1:13 PM  [facility] Hospitalist

## 2021-10-29 NOTE — PLAN OF CARE
Problem: Pain:  Goal: Pain level will decrease  Description: Pain level will decrease  Outcome: Ongoing  Goal: Control of acute pain  Description: Control of acute pain  Outcome: Ongoing  Goal: Control of chronic pain  Description: Control of chronic pain  Outcome: Ongoing  Goal: Patient's pain/discomfort is manageable  Description: Patient's pain/discomfort is manageable  10/29/2021 0755 by Dorie Spatz, RN  Outcome: Ongoing  10/29/2021 0426 by Kvng Monroe RN  Outcome: Met This Shift     Problem: Infection:  Goal: Will remain free from infection  Description: Will remain free from infection  10/29/2021 0755 by Dorie Spatz, RN  Outcome: Ongoing  10/29/2021 0426 by Kvng Monroe RN  Outcome: Ongoing     Problem: Daily Care:  Goal: Daily care needs are met  Description: Daily care needs are met  10/29/2021 0755 by Dorie Spatz, RN  Outcome: Ongoing  10/29/2021 0426 by Kvng Monroe RN  Outcome: Met This Shift     Problem: Discharge Planning:  Goal: Patients continuum of care needs are met  Description: Patients continuum of care needs are met  10/29/2021 0755 by Dorie Spatz, RN  Outcome: Ongoing  10/29/2021 0426 by Kvng Monroe RN  Outcome: Ongoing

## 2021-10-30 VITALS
SYSTOLIC BLOOD PRESSURE: 114 MMHG | RESPIRATION RATE: 16 BRPM | HEIGHT: 72 IN | BODY MASS INDEX: 19.91 KG/M2 | WEIGHT: 147 LBS | DIASTOLIC BLOOD PRESSURE: 57 MMHG | HEART RATE: 44 BPM | OXYGEN SATURATION: 98 % | TEMPERATURE: 98.4 F

## 2021-10-30 LAB
ALBUMIN SERPL-MCNC: 2.9 G/DL (ref 3.5–5.2)
ALP BLD-CCNC: 35 U/L (ref 35–104)
ALT SERPL-CCNC: 9 U/L (ref 0–32)
ANION GAP SERPL CALCULATED.3IONS-SCNC: 13 MMOL/L (ref 7–16)
AST SERPL-CCNC: 9 U/L (ref 0–31)
BASOPHILS ABSOLUTE: 0.05 E9/L (ref 0–0.2)
BASOPHILS RELATIVE PERCENT: 0.5 % (ref 0–2)
BILIRUB SERPL-MCNC: <0.2 MG/DL (ref 0–1.2)
BUN BLDV-MCNC: 6 MG/DL (ref 6–20)
CALCIUM SERPL-MCNC: 7.7 MG/DL (ref 8.6–10.2)
CHLORIDE BLD-SCNC: 105 MMOL/L (ref 98–107)
CO2: 24 MMOL/L (ref 22–29)
CREAT SERPL-MCNC: 0.6 MG/DL (ref 0.5–1)
EOSINOPHILS ABSOLUTE: 0.17 E9/L (ref 0.05–0.5)
EOSINOPHILS RELATIVE PERCENT: 1.8 % (ref 0–6)
GFR AFRICAN AMERICAN: >60
GFR NON-AFRICAN AMERICAN: >60 ML/MIN/1.73
GLUCOSE BLD-MCNC: 89 MG/DL (ref 74–99)
HCT VFR BLD CALC: 33.2 % (ref 34–48)
HEMOGLOBIN: 10.6 G/DL (ref 11.5–15.5)
IMMATURE GRANULOCYTES #: 0.05 E9/L
IMMATURE GRANULOCYTES %: 0.5 % (ref 0–5)
LYMPHOCYTES ABSOLUTE: 2.4 E9/L (ref 1.5–4)
LYMPHOCYTES RELATIVE PERCENT: 25.7 % (ref 20–42)
MCH RBC QN AUTO: 29.3 PG (ref 26–35)
MCHC RBC AUTO-ENTMCNC: 31.9 % (ref 32–34.5)
MCV RBC AUTO: 91.7 FL (ref 80–99.9)
MONOCYTES ABSOLUTE: 0.67 E9/L (ref 0.1–0.95)
MONOCYTES RELATIVE PERCENT: 7.2 % (ref 2–12)
NEUTROPHILS ABSOLUTE: 6 E9/L (ref 1.8–7.3)
NEUTROPHILS RELATIVE PERCENT: 64.3 % (ref 43–80)
PDW BLD-RTO: 13.2 FL (ref 11.5–15)
PLATELET # BLD: 255 E9/L (ref 130–450)
PMV BLD AUTO: 10.2 FL (ref 7–12)
POTASSIUM REFLEX MAGNESIUM: 3.8 MMOL/L (ref 3.5–5)
RBC # BLD: 3.62 E12/L (ref 3.5–5.5)
SODIUM BLD-SCNC: 142 MMOL/L (ref 132–146)
TOTAL PROTEIN: 5.1 G/DL (ref 6.4–8.3)
WBC # BLD: 9.3 E9/L (ref 4.5–11.5)

## 2021-10-30 PROCEDURE — 6370000000 HC RX 637 (ALT 250 FOR IP): Performed by: HOSPITALIST

## 2021-10-30 PROCEDURE — 6370000000 HC RX 637 (ALT 250 FOR IP): Performed by: FAMILY MEDICINE

## 2021-10-30 PROCEDURE — 6360000002 HC RX W HCPCS: Performed by: FAMILY MEDICINE

## 2021-10-30 PROCEDURE — 80053 COMPREHEN METABOLIC PANEL: CPT

## 2021-10-30 PROCEDURE — 85025 COMPLETE CBC W/AUTO DIFF WBC: CPT

## 2021-10-30 PROCEDURE — 2580000003 HC RX 258: Performed by: FAMILY MEDICINE

## 2021-10-30 PROCEDURE — 36415 COLL VENOUS BLD VENIPUNCTURE: CPT

## 2021-10-30 RX ORDER — HYDROCODONE BITARTRATE AND ACETAMINOPHEN 7.5; 325 MG/1; MG/1
1 TABLET ORAL EVERY 6 HOURS PRN
Qty: 20 TABLET | Refills: 0 | Status: SHIPPED | OUTPATIENT
Start: 2021-10-30 | End: 2021-11-02

## 2021-10-30 RX ADMIN — PROMETHAZINE HYDROCHLORIDE 12.5 MG: 25 TABLET ORAL at 10:49

## 2021-10-30 RX ADMIN — SODIUM CHLORIDE 3000 MG: 900 INJECTION INTRAVENOUS at 05:57

## 2021-10-30 RX ADMIN — ACETAMINOPHEN 650 MG: 325 TABLET ORAL at 05:57

## 2021-10-30 RX ADMIN — HYDROCODONE BITARTRATE AND ACETAMINOPHEN 1 TABLET: 7.5; 325 TABLET ORAL at 10:50

## 2021-10-30 RX ADMIN — SODIUM CHLORIDE 3000 MG: 900 INJECTION INTRAVENOUS at 11:52

## 2021-10-30 RX ADMIN — SODIUM CHLORIDE, PRESERVATIVE FREE 10 ML: 5 INJECTION INTRAVENOUS at 11:52

## 2021-10-30 RX ADMIN — HYDROCODONE BITARTRATE AND ACETAMINOPHEN 1 TABLET: 7.5; 325 TABLET ORAL at 01:37

## 2021-10-30 ASSESSMENT — PAIN SCALES - GENERAL
PAINLEVEL_OUTOF10: 7
PAINLEVEL_OUTOF10: 5
PAINLEVEL_OUTOF10: 6
PAINLEVEL_OUTOF10: 3
PAINLEVEL_OUTOF10: 9
PAINLEVEL_OUTOF10: 2

## 2021-10-30 ASSESSMENT — PAIN DESCRIPTION - PAIN TYPE
TYPE: ACUTE PAIN;SURGICAL PAIN
TYPE: ACUTE PAIN;SURGICAL PAIN

## 2021-10-30 ASSESSMENT — PAIN DESCRIPTION - LOCATION
LOCATION: JAW
LOCATION: JAW

## 2021-10-30 ASSESSMENT — PAIN DESCRIPTION - DESCRIPTORS
DESCRIPTORS: ACHING;CONSTANT;DISCOMFORT
DESCRIPTORS: ACHING;CONSTANT;DISCOMFORT

## 2021-10-30 ASSESSMENT — PAIN DESCRIPTION - ORIENTATION
ORIENTATION: RIGHT
ORIENTATION: RIGHT

## 2021-11-01 LAB
BLOOD CULTURE, ROUTINE: NORMAL
CULTURE, BLOOD 2: NORMAL

## 2022-04-13 NOTE — DISCHARGE SUMMARY
Discharge Summary    Patient ID   Angelia Baker [facility]   1975  [id number]    Primary Care: No primary care provider on file. Admit date: 10/27/2021   Discharge date: 10/30/2021    Medical Record number: [id number]   Admitting Physician: Rajendra Hameed DO   Discharge Physician: Reji Hall MD    Consultants: ENT, Dentist    Procedures: none    Discharge Diagnoses:      Patient Active Problem List   Diagnosis Code    Proteinuria R80.9    Atypical chest pain R07.89    H/O hypotension Z86.79    Anxiety F41.9    COPD (chronic obstructive pulmonary disease) [address] [address] J44.9    Tobacco abuse Z72.0    Episodic tension-type headache, not intractable G44.219    Spasm of muscle M62.838    Strain of lumbar region S39.012A    Facial abscess L02.01         Hospital Course:  Ms RAVI LACY is 40 YF whose hospital course is listed below:  1. Acute dental caries with abscess status post extraction hypertension continue an incision and debridement-no airway involvement on the CT no fulminant abscess, patient continues on IV antibiotics pain is controlled with medication  10/30/2021- s/p extraction, will continue atbx, rinces and f/u in 1 week to dental clinic  2. Acute leukocytosis probably due to the infection-we will continue to monitor lab studies is declining and trending down  3. COPD with tobacco dependency-patient denies having a patch at this time  4.  Chronic headaches-ibuprofen as needed  Patient improved with medical management and is stable for diacharge    Physical Exam:   BP (!) 114/57   Pulse (!) 44   Temp 98.4 °F (36.9 °C) (Temporal)   Resp 16   Ht 6' (1.829 m)   Wt 147 lb (66.7 kg)   LMP 10/26/2021   SpO2 98%   BMI 19.94 kg/m²   Neck: no JVD  Lungs: equal BS, clear   CV: RRR, normal S1S2, no significant murmur  Abdomen: soft, nontender, normally active BS, no masses or tenderness  Extremities: no edema or cords  Neurologic: alert, oriented, no focal CN or motor deficit        Medications: see [Time Spent: ___ minutes] : I have spent [unfilled] minutes of time on the encounter. computerized discharge medication list     Medication List      START taking these medications    HYDROcodone-acetaminophen 7.5-325 MG per tablet  Commonly known as: NORCO  Take 1 tablet by mouth every 6 hours as needed for Pain for up to 3 days.         CONTINUE taking these medications    clindamycin 300 MG capsule  Commonly known as: CLEOCIN     ibuprofen 800 MG tablet  Commonly known as: ADVIL;MOTRIN           Where to Get Your Medications      You can get these medications from any pharmacy    Bring a paper prescription for each of these medications  · HYDROcodone-acetaminophen 7.5-325 MG per tablet       Patient Instructions: resume home medications and any changes while in the hospital.      Discharged Condition: good  Disposition: home  Activity: activity as tolerated  Diet: regular diet  Wound Care: none needed    Follow-up: own pcp in 1-2 weeks         Electronically signed by Rachelle Turner MD on 10/30/2021 at 12:30 PM  [address] [place] [address]   Time spent on discharge 45  minutes

## 2023-07-28 ENCOUNTER — HOSPITAL ENCOUNTER (OUTPATIENT)
Dept: GENERAL RADIOLOGY | Age: 48
End: 2023-07-28
Payer: COMMERCIAL

## 2023-07-28 ENCOUNTER — HOSPITAL ENCOUNTER (OUTPATIENT)
Dept: ULTRASOUND IMAGING | Age: 48
End: 2023-07-28
Payer: COMMERCIAL

## 2023-07-28 ENCOUNTER — HOSPITAL ENCOUNTER (OUTPATIENT)
Age: 48
End: 2023-07-28
Payer: COMMERCIAL

## 2023-07-28 DIAGNOSIS — R31.29 OTHER MICROSCOPIC HEMATURIA: ICD-10-CM

## 2023-07-28 DIAGNOSIS — N39.41 URGE INCONTINENCE: ICD-10-CM

## 2023-07-28 DIAGNOSIS — N23 UNSPECIFIED RENAL COLIC: ICD-10-CM

## 2023-07-28 DIAGNOSIS — N20.0 CALCULUS OF KIDNEY: ICD-10-CM

## 2023-07-28 PROCEDURE — 74018 RADEX ABDOMEN 1 VIEW: CPT

## 2023-07-28 PROCEDURE — 76770 US EXAM ABDO BACK WALL COMP: CPT

## 2023-08-03 NOTE — ED NOTES
Report called to ortho floor, advised them that patient won't be there for another 4 hours. Will call and update them as needed.       Lubna Gautam RN  10/27/21 3221 negative

## 2023-09-26 ENCOUNTER — HOSPITAL ENCOUNTER (OUTPATIENT)
Age: 48
Discharge: HOME | End: 2023-09-26
Payer: COMMERCIAL

## 2023-09-26 LAB
BUN SERPL-MCNC: 13 MG/DL (ref 6–20)
CREAT SERPL-MCNC: 0.7 MG/DL (ref 0.5–1)
GFR SERPL CREATININE-BSD FRML MDRD: >60 ML/MIN/1.73M2

## 2023-09-26 PROCEDURE — 84520 ASSAY OF UREA NITROGEN: CPT

## 2023-09-26 PROCEDURE — 82565 ASSAY OF CREATININE: CPT

## 2024-03-15 ENCOUNTER — HOSPITAL ENCOUNTER (EMERGENCY)
Age: 49
Discharge: HOME | End: 2024-03-16
Attending: STUDENT IN AN ORGANIZED HEALTH CARE EDUCATION/TRAINING PROGRAM
Payer: COMMERCIAL

## 2024-03-15 ENCOUNTER — APPOINTMENT (OUTPATIENT)
Dept: CT IMAGING | Age: 49
End: 2024-03-15
Payer: COMMERCIAL

## 2024-03-15 ENCOUNTER — APPOINTMENT (OUTPATIENT)
Dept: GENERAL RADIOLOGY | Age: 49
End: 2024-03-15
Payer: COMMERCIAL

## 2024-03-15 VITALS
TEMPERATURE: 98.4 F | HEIGHT: 72 IN | RESPIRATION RATE: 16 BRPM | HEART RATE: 92 BPM | BODY MASS INDEX: 20.32 KG/M2 | WEIGHT: 150 LBS | DIASTOLIC BLOOD PRESSURE: 75 MMHG | OXYGEN SATURATION: 100 % | SYSTOLIC BLOOD PRESSURE: 112 MMHG

## 2024-03-15 DIAGNOSIS — R42 DIZZINESS: Primary | ICD-10-CM

## 2024-03-15 LAB
ALBUMIN SERPL-MCNC: 3.9 G/DL (ref 3.5–5.2)
ALP SERPL-CCNC: 51 U/L (ref 35–104)
ALT SERPL-CCNC: 7 U/L (ref 0–32)
ANION GAP SERPL CALCULATED.3IONS-SCNC: 8 MMOL/L (ref 7–16)
AST SERPL-CCNC: 12 U/L (ref 0–31)
BASOPHILS # BLD: 0.07 K/UL (ref 0–0.2)
BASOPHILS NFR BLD: 1 % (ref 0–2)
BILIRUB SERPL-MCNC: 0.4 MG/DL (ref 0–1.2)
BUN SERPL-MCNC: 8 MG/DL (ref 6–20)
CALCIUM SERPL-MCNC: 9.1 MG/DL (ref 8.6–10.2)
CHLORIDE SERPL-SCNC: 103 MMOL/L (ref 98–107)
CO2 SERPL-SCNC: 24 MMOL/L (ref 22–29)
CREAT SERPL-MCNC: 0.7 MG/DL (ref 0.5–1)
EKG ATRIAL RATE: 65 BPM
EKG P AXIS: 79 DEGREES
EKG P-R INTERVAL: 188 MS
EKG Q-T INTERVAL: 376 MS
EKG QRS DURATION: 86 MS
EKG QTC CALCULATION (BAZETT): 391 MS
EKG R AXIS: 102 DEGREES
EKG T AXIS: 51 DEGREES
EKG VENTRICULAR RATE: 65 BPM
EOSINOPHIL # BLD: 0.22 K/UL (ref 0.05–0.5)
EOSINOPHILS RELATIVE PERCENT: 2 % (ref 0–6)
ERYTHROCYTE [DISTWIDTH] IN BLOOD BY AUTOMATED COUNT: 13.3 % (ref 11.5–15)
GFR SERPL CREATININE-BSD FRML MDRD: >60 ML/MIN/1.73M2
GLUCOSE SERPL-MCNC: 94 MG/DL (ref 74–99)
HCT VFR BLD AUTO: 42.8 % (ref 34–48)
HGB BLD-MCNC: 13.9 G/DL (ref 11.5–15.5)
IMM GRANULOCYTES # BLD AUTO: 0.04 K/UL (ref 0–0.58)
IMM GRANULOCYTES NFR BLD: 0 % (ref 0–5)
LACTATE BLDV-SCNC: 0.8 MMOL/L (ref 0.5–2.2)
LIPASE SERPL-CCNC: 18 U/L (ref 13–60)
LYMPHOCYTES NFR BLD: 1.2 K/UL (ref 1.5–4)
LYMPHOCYTES RELATIVE PERCENT: 12 % (ref 20–42)
MCH RBC QN AUTO: 29.6 PG (ref 26–35)
MCHC RBC AUTO-ENTMCNC: 32.5 G/DL (ref 32–34.5)
MCV RBC AUTO: 91.3 FL (ref 80–99.9)
MONOCYTES NFR BLD: 0.44 K/UL (ref 0.1–0.95)
MONOCYTES NFR BLD: 5 % (ref 2–12)
NEUTROPHILS NFR BLD: 80 % (ref 43–80)
NEUTS SEG NFR BLD: 7.81 K/UL (ref 1.8–7.3)
PLATELET # BLD AUTO: 304 K/UL (ref 130–450)
PMV BLD AUTO: 10 FL (ref 7–12)
POTASSIUM SERPL-SCNC: 3.9 MMOL/L (ref 3.5–5)
PROT SERPL-MCNC: 6.9 G/DL (ref 6.4–8.3)
RBC # BLD AUTO: 4.69 M/UL (ref 3.5–5.5)
SODIUM SERPL-SCNC: 135 MMOL/L (ref 132–146)
TROPONIN I SERPL HS-MCNC: <6 NG/L (ref 0–9)
WBC OTHER # BLD: 9.8 K/UL (ref 4.5–11.5)

## 2024-03-15 PROCEDURE — 83605 ASSAY OF LACTIC ACID: CPT

## 2024-03-15 PROCEDURE — 84484 ASSAY OF TROPONIN QUANT: CPT

## 2024-03-15 PROCEDURE — 80053 COMPREHEN METABOLIC PANEL: CPT

## 2024-03-15 PROCEDURE — 96374 THER/PROPH/DIAG INJ IV PUSH: CPT

## 2024-03-15 PROCEDURE — 70450 CT HEAD/BRAIN W/O DYE: CPT

## 2024-03-15 PROCEDURE — 6370000000 HC RX 637 (ALT 250 FOR IP): Performed by: STUDENT IN AN ORGANIZED HEALTH CARE EDUCATION/TRAINING PROGRAM

## 2024-03-15 PROCEDURE — 71046 X-RAY EXAM CHEST 2 VIEWS: CPT

## 2024-03-15 PROCEDURE — 83690 ASSAY OF LIPASE: CPT

## 2024-03-15 PROCEDURE — 6360000002 HC RX W HCPCS: Performed by: STUDENT IN AN ORGANIZED HEALTH CARE EDUCATION/TRAINING PROGRAM

## 2024-03-15 PROCEDURE — 99285 EMERGENCY DEPT VISIT HI MDM: CPT

## 2024-03-15 PROCEDURE — 2580000003 HC RX 258: Performed by: STUDENT IN AN ORGANIZED HEALTH CARE EDUCATION/TRAINING PROGRAM

## 2024-03-15 PROCEDURE — 93005 ELECTROCARDIOGRAM TRACING: CPT

## 2024-03-15 PROCEDURE — 85025 COMPLETE CBC W/AUTO DIFF WBC: CPT

## 2024-03-15 RX ORDER — MECLIZINE HCL 12.5 MG/1
37.5 TABLET ORAL ONCE
Status: COMPLETED | OUTPATIENT
Start: 2024-03-15 | End: 2024-03-15

## 2024-03-15 RX ORDER — ONDANSETRON 2 MG/ML
4 INJECTION INTRAMUSCULAR; INTRAVENOUS ONCE
Status: COMPLETED | OUTPATIENT
Start: 2024-03-15 | End: 2024-03-15

## 2024-03-15 RX ORDER — 0.9 % SODIUM CHLORIDE 0.9 %
1000 INTRAVENOUS SOLUTION INTRAVENOUS ONCE
Status: COMPLETED | OUTPATIENT
Start: 2024-03-15 | End: 2024-03-16

## 2024-03-15 RX ADMIN — MECLIZINE 37.5 MG: 12.5 TABLET ORAL at 22:03

## 2024-03-15 RX ADMIN — ONDANSETRON 4 MG: 2 INJECTION INTRAMUSCULAR; INTRAVENOUS at 22:03

## 2024-03-15 RX ADMIN — SODIUM CHLORIDE 1000 ML: 9 INJECTION, SOLUTION INTRAVENOUS at 22:03

## 2024-03-15 ASSESSMENT — LIFESTYLE VARIABLES
HOW MANY STANDARD DRINKS CONTAINING ALCOHOL DO YOU HAVE ON A TYPICAL DAY: PATIENT DOES NOT DRINK
HOW OFTEN DO YOU HAVE A DRINK CONTAINING ALCOHOL: NEVER

## 2024-03-15 ASSESSMENT — PAIN - FUNCTIONAL ASSESSMENT: PAIN_FUNCTIONAL_ASSESSMENT: NONE - DENIES PAIN

## 2024-03-15 NOTE — ED NOTES
Department of Emergency Medicine  FIRST PROVIDER TRIAGE NOTE             Independent MLP           3/15/24  6:20 PM EDT    Date of Encounter: 3/15/24   MRN: [id number]      HPI: Komal Madera is a 48 y.o. female who presents to the ED for Dizziness and Fatigue (For a few weeks.  Going through menopause)  States she had menstrual cycle in July and then     ROS: Negative for cp or sob.    PE: Gen Appearance/Constitutional: alert  Musculoskeletal: moves all extremities x 4     Initial Plan of Care: All treatment areas with department are currently occupied. Plan to order/Initiate the following while awaiting opening in ED: labs and EKG.  Initiate Treatment-Testing, Proceed toTreatment Area When Bed Available for ED Attending/MLP to Continue Care    Electronically signed by GUME Joyce NP   DD: 3/15/24       Cheng Ricketts APRN - NP  03/15/24 [id number]

## 2024-03-16 RX ORDER — ONDANSETRON 4 MG/1
4 TABLET, ORALLY DISINTEGRATING ORAL 3 TIMES DAILY PRN
Qty: 21 TABLET | Refills: 0 | Status: SHIPPED | OUTPATIENT
Start: 2024-03-16

## 2024-03-16 RX ORDER — MECLIZINE HYDROCHLORIDE 25 MG/1
25 TABLET ORAL 3 TIMES DAILY PRN
Qty: 15 TABLET | Refills: 0 | Status: SHIPPED | OUTPATIENT
Start: 2024-03-16 | End: 2024-03-26

## 2024-03-16 NOTE — ED PROVIDER NOTES
Department of Emergency Medicine   ED  Provider Note  Admit Date/RoomTime: 3/15/2024  9:31 PM  ED Room: /          History of Present Illness:    3/15/24, Time: 9:44 PM EDT         Komal Madera is a 48 y.o. female presenting to the ED for complaint of dizziness described as a lightheadedness as well as a racing sensation symptoms going on for about 2 weeks worse whenever she stands up suddenly.  Has to collect herself for to go away.  Denies any weakness arms legs denies any chest pain shortness breath or abdominal pain denies any fevers or chills denies any change in bowel bladder habits.  Otherwise no other acute complaints this time.  Does elicit nausea with her symptoms does state symptoms had returned when she went to CAT scan due to having to sit up and stand up suddenly,     Review of Systems:   Pertinent positives and negatives are stated within HPI, all other systems reviewed and are negative.        --------------------------------------------- PAST HISTORY ---------------------------------------------  Past Medical History:  has a past medical history of Anxiety, Asthma, COPD (chronic obstructive pulmonary disease) (HCC), Ectopic pregnancy, Migraines, Ovarian cyst, and Tobacco abuse.    Past Surgical History:  has a past surgical history that includes Ovarian cyst surgery.    Social History:  reports that she has been smoking. She has a 2.3 pack-year smoking history. She has never used smokeless tobacco. She reports that she does not drink alcohol and does not use drugs.    Family History: family history includes Heart Attack in her father; Heart Disease in her mother; Stroke in her father.     The patient’s home medications have been reviewed.    Allergies: Morphine and Penicillins        ---------------------------------------------------PHYSICAL EXAM--------------------------------------    Constitutional/General: Alert and oriented x3  Head: Normocephalic and atraumatic  Eyes: PERRL, EOMI,  course.          Counseling:   The emergency provider has spoken with the patient and discussed today’s results, in addition to providing specific details for the plan of care and counseling regarding the diagnosis and prognosis.  Questions are answered at this time and they are agreeable with the plan.       --------------------------------- IMPRESSION AND DISPOSITION ---------------------------------    IMPRESSION  1. Dizziness        DISPOSITION  Disposition: Discharge to home  Patient condition is stable        NOTE: This report was transcribed using voice recognition software. Every effort was made to ensure accuracy; however, inadvertent computerized transcription errors may be present       Kel Reza MD  03/16/24 0004

## 2024-03-18 LAB
EKG ATRIAL RATE: 65 BPM
EKG P AXIS: 79 DEGREES
EKG P-R INTERVAL: 188 MS
EKG Q-T INTERVAL: 376 MS
EKG QRS DURATION: 86 MS
EKG QTC CALCULATION (BAZETT): 391 MS
EKG R AXIS: 102 DEGREES
EKG T AXIS: 51 DEGREES
EKG VENTRICULAR RATE: 65 BPM

## 2024-03-18 PROCEDURE — 93010 ELECTROCARDIOGRAM REPORT: CPT | Performed by: INTERNAL MEDICINE

## 2024-06-19 ENCOUNTER — HOSPITAL ENCOUNTER (EMERGENCY)
Age: 49
Discharge: HOME | End: 2024-06-19
Attending: EMERGENCY MEDICINE
Payer: COMMERCIAL

## 2024-06-19 ENCOUNTER — APPOINTMENT (OUTPATIENT)
Dept: GENERAL RADIOLOGY | Age: 49
End: 2024-06-19
Payer: COMMERCIAL

## 2024-06-19 VITALS
OXYGEN SATURATION: 98 % | RESPIRATION RATE: 18 BRPM | WEIGHT: 157 LBS | SYSTOLIC BLOOD PRESSURE: 114 MMHG | BODY MASS INDEX: 21.29 KG/M2 | HEART RATE: 93 BPM | DIASTOLIC BLOOD PRESSURE: 73 MMHG | TEMPERATURE: 98.4 F

## 2024-06-19 DIAGNOSIS — R07.89 CHEST WALL PAIN: ICD-10-CM

## 2024-06-19 DIAGNOSIS — J06.9 VIRAL URI WITH COUGH: Primary | ICD-10-CM

## 2024-06-19 PROCEDURE — 6370000000 HC RX 637 (ALT 250 FOR IP): Performed by: EMERGENCY MEDICINE

## 2024-06-19 PROCEDURE — 71045 X-RAY EXAM CHEST 1 VIEW: CPT

## 2024-06-19 PROCEDURE — 99283 EMERGENCY DEPT VISIT LOW MDM: CPT

## 2024-06-19 RX ORDER — BENZONATATE 100 MG/1
100 CAPSULE ORAL 2 TIMES DAILY PRN
Qty: 20 CAPSULE | Refills: 0 | Status: SHIPPED | OUTPATIENT
Start: 2024-06-19 | End: 2024-06-26

## 2024-06-19 RX ORDER — IBUPROFEN 800 MG/1
800 TABLET ORAL ONCE
Status: COMPLETED | OUTPATIENT
Start: 2024-06-19 | End: 2024-06-19

## 2024-06-19 RX ORDER — CYCLOBENZAPRINE HCL 10 MG
10 TABLET ORAL 3 TIMES DAILY PRN
Qty: 21 TABLET | Refills: 0 | Status: SHIPPED | OUTPATIENT
Start: 2024-06-19 | End: 2024-06-29

## 2024-06-19 RX ORDER — PREDNISONE 50 MG/1
50 TABLET ORAL DAILY
Qty: 5 TABLET | Refills: 0 | Status: SHIPPED | OUTPATIENT
Start: 2024-06-19 | End: 2024-06-24

## 2024-06-19 RX ORDER — PREDNISONE 20 MG/1
60 TABLET ORAL ONCE
Status: COMPLETED | OUTPATIENT
Start: 2024-06-19 | End: 2024-06-19

## 2024-06-19 RX ADMIN — IBUPROFEN 800 MG: 800 TABLET, FILM COATED ORAL at 16:33

## 2024-06-19 RX ADMIN — PREDNISONE 60 MG: 20 TABLET ORAL at 16:33

## 2024-06-19 ASSESSMENT — PAIN DESCRIPTION - LOCATION: LOCATION: RIB CAGE

## 2024-06-19 ASSESSMENT — PAIN SCALES - GENERAL: PAINLEVEL_OUTOF10: 10

## 2024-06-19 ASSESSMENT — PAIN DESCRIPTION - ORIENTATION: ORIENTATION: RIGHT

## 2024-06-19 ASSESSMENT — PAIN DESCRIPTION - PAIN TYPE: TYPE: ACUTE PAIN

## 2024-06-19 ASSESSMENT — PAIN - FUNCTIONAL ASSESSMENT: PAIN_FUNCTIONAL_ASSESSMENT: 0-10

## 2024-06-19 ASSESSMENT — PAIN DESCRIPTION - DESCRIPTORS: DESCRIPTORS: ACHING;DISCOMFORT;SORE;SHARP;SPASM

## 2024-06-19 ASSESSMENT — LIFESTYLE VARIABLES
HOW OFTEN DO YOU HAVE A DRINK CONTAINING ALCOHOL: NEVER
HOW MANY STANDARD DRINKS CONTAINING ALCOHOL DO YOU HAVE ON A TYPICAL DAY: PATIENT DOES NOT DRINK

## 2024-06-19 ASSESSMENT — PAIN DESCRIPTION - FREQUENCY: FREQUENCY: CONTINUOUS

## 2024-06-19 NOTE — ED PROVIDER NOTES
HPI:  6/19/24,   Time: 4:25 PM EDT       Komal Madera is a 48 y.o. female presenting to the ED for cough/congestion/left rib pain, beginning 5 days ago.  The complaint has been persistent, mild in severity, and worsened by nothing.  URI symptoms are 5 days.  Sharp pain left chest over the past 24 to 48 hours.  Hurts to move touch and cough.  Thinks pulled muscle.  No fevers chills or sweats.  No nausea vomiting diarrhea    Review of Systems:   Pertinent positives and negatives are stated within HPI, all other systems reviewed and are negative.          --------------------------------------------- PAST HISTORY ---------------------------------------------  Past Medical History:  has a past medical history of Anxiety, Asthma, COPD (chronic obstructive pulmonary disease) (HCC), Ectopic pregnancy, Migraines, Ovarian cyst, and Tobacco abuse.    Past Surgical History:  has a past surgical history that includes Ovarian cyst surgery.    Social History:  reports that she has been smoking. She has a 2.3 pack-year smoking history. She has never used smokeless tobacco. She reports that she does not drink alcohol and does not use drugs.    Family History: family history includes Heart Attack in her father; Heart Disease in her mother; Stroke in her father.     The patient’s home medications have been reviewed.    Allergies: Morphine and Penicillins        ---------------------------------------------------PHYSICAL EXAM--------------------------------------    Constitutional/General: Alert and oriented x3, well appearing, non toxic in NAD  Head: Normocephalic and atraumatic  Eyes: PERRL, EOMI, conjunctive normal, sclera non icteric  Mouth: Oropharynx clear, handling secretions, no trismus, no asymmetry of the posterior oropharynx or uvular edema  Neck: Supple, full ROM,   Respiratory: i. Not in respiratory distress  Cardiovascular:  Regular rate. Regular rhythm.  2+ distal pulses  Chest: left ant chest wall tenderness  GI:

## 2024-11-04 ENCOUNTER — HOSPITAL ENCOUNTER (EMERGENCY)
Age: 49
Discharge: HOME | End: 2024-11-04
Payer: COMMERCIAL

## 2024-11-04 ENCOUNTER — APPOINTMENT (OUTPATIENT)
Dept: GENERAL RADIOLOGY | Age: 49
End: 2024-11-04
Payer: COMMERCIAL

## 2024-11-04 VITALS
TEMPERATURE: 97.7 F | RESPIRATION RATE: 18 BRPM | OXYGEN SATURATION: 100 % | WEIGHT: 157 LBS | BODY MASS INDEX: 21.26 KG/M2 | SYSTOLIC BLOOD PRESSURE: 133 MMHG | HEART RATE: 67 BPM | DIASTOLIC BLOOD PRESSURE: 81 MMHG | HEIGHT: 72 IN

## 2024-11-04 DIAGNOSIS — S80.02XA CONTUSION OF LEFT KNEE, INITIAL ENCOUNTER: ICD-10-CM

## 2024-11-04 DIAGNOSIS — S89.92XA INJURY OF LEFT KNEE, INITIAL ENCOUNTER: Primary | ICD-10-CM

## 2024-11-04 PROCEDURE — 99283 EMERGENCY DEPT VISIT LOW MDM: CPT

## 2024-11-04 PROCEDURE — 73562 X-RAY EXAM OF KNEE 3: CPT

## 2024-11-04 RX ORDER — ACETAMINOPHEN 325 MG/1
650 TABLET ORAL ONCE
Status: DISCONTINUED | OUTPATIENT
Start: 2024-11-04 | End: 2024-11-04 | Stop reason: HOSPADM

## 2024-11-04 RX ORDER — ALPRAZOLAM 1 MG/1
1 TABLET ORAL 3 TIMES DAILY PRN
COMMUNITY

## 2024-11-04 ASSESSMENT — PAIN DESCRIPTION - DESCRIPTORS: DESCRIPTORS: THROBBING;SHARP

## 2024-11-04 ASSESSMENT — PAIN DESCRIPTION - LOCATION: LOCATION: KNEE

## 2024-11-04 ASSESSMENT — PAIN SCALES - GENERAL: PAINLEVEL_OUTOF10: 7

## 2024-11-04 ASSESSMENT — ENCOUNTER SYMPTOMS
EYES NEGATIVE: 1
GASTROINTESTINAL NEGATIVE: 1
RESPIRATORY NEGATIVE: 1
ALLERGIC/IMMUNOLOGIC NEGATIVE: 1

## 2024-11-04 ASSESSMENT — PAIN DESCRIPTION - PAIN TYPE: TYPE: ACUTE PAIN

## 2024-11-04 ASSESSMENT — PAIN DESCRIPTION - ORIENTATION: ORIENTATION: LEFT

## 2024-11-04 NOTE — ED PROVIDER NOTES
[facility] EMERGENCY DEPARTMENT  EMERGENCY DEPARTMENT ENCOUNTER        Pt Name: Komal Madera  MRN: [id number]  Birthdate 1975  Date of evaluation: 11/4/2024  Provider: GUME Gooden - CNP  PCP: Celia Marcos FNP  Note Started: 5:14 PM EST 11/4/24      MOHAN. I have evaluated this patient.        CHIEF COMPLAINT       Chief Complaint   Patient presents with    Knee Pain     L knee pain, fall 1 week ago       HISTORY OF PRESENT ILLNESS: 1 or more Elements     History from : Patient    Limitations to history : None    Komal Madera is a 49 y.o. female who presents to the ED for a knee injury that occurred 1 week ago.  Patient states she got out of bed when her leg \"gave out\" and she fell onto her left knee.  Patient denies any numbness, tingling, weakness.  Patient states she has not taken anything for the pain.  Patient states that she has been ambulatory since the fall.  Patient denies any other injury.  Patient denies any chest pain, shortness of breath, abdominal pain, headache, dizziness, lightheadedness, fever, chills, body aches.  Patient denies any other symptoms.  Patient has no other complaints at this time.    Nursing Notes were all reviewed and agreed with or any disagreements were addressed in the HPI.    REVIEW OF SYSTEMS :      Review of Systems   Constitutional: Negative.    HENT: Negative.     Eyes: Negative.    Respiratory: Negative.     Cardiovascular: Negative.    Gastrointestinal: Negative.    Endocrine: Negative.    Genitourinary: Negative.    Musculoskeletal:         Left knee pain   Skin: Negative.    Allergic/Immunologic: Negative.    Neurological: Negative.    Hematological: Negative.    Psychiatric/Behavioral: Negative.         Positives and Pertinent negatives as per HPI.     SURGICAL HISTORY     Past Surgical History:   Procedure Laterality Date    OVARIAN CYST SURGERY         CURRENTMEDICATIONS       Discharge Medication

## 2024-12-09 ENCOUNTER — TELEPHONE (OUTPATIENT)
Dept: ORTHOPEDIC SURGERY | Age: 49
End: 2024-12-09

## 2024-12-09 NOTE — TELEPHONE ENCOUNTER
Referral received for patient via internal work-queue.     Referral reason/diagnosis: Left knee injury     Routed to providers for recommendations.    No future appointments.    Electronically signed by Robyn Fields on 12/9/2024 at 12:34 PM

## 2024-12-09 NOTE — TELEPHONE ENCOUNTER
Recommend referral to Sports Medicine provider, Dr. Carl Mendez with [facility] or Dr. Paul Pike with [facility]

## 2025-04-24 ENCOUNTER — APPOINTMENT (OUTPATIENT)
Dept: GENERAL RADIOLOGY | Age: 50
End: 2025-04-24
Payer: COMMERCIAL

## 2025-04-24 ENCOUNTER — HOSPITAL ENCOUNTER (EMERGENCY)
Age: 50
Discharge: HOME | End: 2025-04-24
Payer: COMMERCIAL

## 2025-04-24 VITALS
RESPIRATION RATE: 18 BRPM | TEMPERATURE: 98.6 F | DIASTOLIC BLOOD PRESSURE: 78 MMHG | HEIGHT: 72 IN | WEIGHT: 157 LBS | BODY MASS INDEX: 21.26 KG/M2 | SYSTOLIC BLOOD PRESSURE: 111 MMHG | HEART RATE: 79 BPM | OXYGEN SATURATION: 100 %
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
## 2025-04-24 DIAGNOSIS — S69.92XA WRIST INJURY, LEFT, INITIAL ENCOUNTER: ICD-10-CM

## 2025-04-24 DIAGNOSIS — S61.512A LACERATION OF LEFT WRIST, INITIAL ENCOUNTER: ICD-10-CM

## 2025-04-24 DIAGNOSIS — S49.92XA INJURY OF LEFT SHOULDER, INITIAL ENCOUNTER: ICD-10-CM

## 2025-04-24 DIAGNOSIS — S81.811A LACERATION OF RIGHT LOWER LEG, INITIAL ENCOUNTER: ICD-10-CM

## 2025-04-24 PROCEDURE — 73110 X-RAY EXAM OF WRIST: CPT

## 2025-04-24 PROCEDURE — 6370000000 HC RX 637 (ALT 250 FOR IP)

## 2025-04-24 PROCEDURE — 73030 X-RAY EXAM OF SHOULDER: CPT

## 2025-04-24 PROCEDURE — 99283 EMERGENCY DEPT VISIT LOW MDM: CPT

## 2025-04-24 RX ORDER — GINSENG 100 MG
CAPSULE ORAL ONCE
Status: COMPLETED | OUTPATIENT
Start: 2025-04-24 | End: 2025-04-24

## 2025-04-24 RX ORDER — BACITRACIN ZINC 500 [USP'U]/G
OINTMENT TOPICAL
Status: COMPLETED
Start: 2025-04-24 | End: 2025-04-24

## 2025-04-24 RX ORDER — ACETAMINOPHEN 325 MG/1
650 TABLET ORAL ONCE
Status: COMPLETED | OUTPATIENT
Start: 2025-04-24 | End: 2025-04-24

## 2025-04-24 RX ADMIN — BACITRACIN: 500 OINTMENT TOPICAL at 21:58

## 2025-04-24 RX ADMIN — Medication: at 21:58

## 2025-04-24 RX ADMIN — ACETAMINOPHEN 650 MG: 325 TABLET ORAL at 21:22

## 2025-04-24 ASSESSMENT — PAIN SCALES - GENERAL: PAINLEVEL_OUTOF10: 7

## 2025-04-24 ASSESSMENT — ENCOUNTER SYMPTOMS
ALLERGIC/IMMUNOLOGIC NEGATIVE: 1
RESPIRATORY NEGATIVE: 1
GASTROINTESTINAL NEGATIVE: 1
EYES NEGATIVE: 1

## 2025-04-24 ASSESSMENT — PAIN DESCRIPTION - LOCATION: LOCATION: SHOULDER

## 2025-04-24 ASSESSMENT — PAIN DESCRIPTION - DESCRIPTORS: DESCRIPTORS: ACHING

## 2025-04-24 ASSESSMENT — PAIN DESCRIPTION - PAIN TYPE: TYPE: ACUTE PAIN

## 2025-04-24 ASSESSMENT — PAIN DESCRIPTION - ORIENTATION: ORIENTATION: LEFT

## 2025-04-24 ASSESSMENT — PAIN - FUNCTIONAL ASSESSMENT: PAIN_FUNCTIONAL_ASSESSMENT: 0-10

## 2025-04-24 NOTE — ED PROVIDER NOTES
[facility] EMERGENCY DEPARTMENT  EMERGENCY DEPARTMENT ENCOUNTER        Pt Name: Komal Madera  MRN: [id number]  Birthdate 1975  Date of evaluation: 4/24/2025  Provider: GUME Gooden - CNP  PCP: Celia Marcos FNP  Note Started: 7:40 PM EDT 4/24/25      MOHAN. I have evaluated this patient.        CHIEF COMPLAINT       Chief Complaint   Patient presents with    Shoulder Pain     Left.   Pt fell on concrete  Put has cut on right leg    Laceration     Right leg       HISTORY OF PRESENT ILLNESS: 1 or more Elements     History from : Patient    Limitations to history : None    Komal Madera is a 49 y.o. female who presents to the ED for evaluation after a fall that occurred today.  Patient states she was stepping over a dog gate when she tripped and fell onto her right side.  Patient denies any head injury, loss of consciousness, use of blood thinners.  Patient complaining of left shoulder pain, left wrist pain, and a laceration to the right lower leg.  Patient is right-hand dominant.  Patient states her tetanus is up-to-date.  Patient denies any numbness, tingling, weakness, lightheadedness, dizziness.  Patient denies any other injury.  Patient denies any chest pain, shortness of breath, abdominal pain, nausea, vomiting, diarrhea, headache, fever, chills, body aches.  Patient denies any other symptoms.  Patient has no other complaints at this time.    Nursing Notes were all reviewed and agreed with or any disagreements were addressed in the HPI.    REVIEW OF SYSTEMS :      Review of Systems   Constitutional: Negative.    HENT: Negative.     Eyes: Negative.    Respiratory: Negative.     Cardiovascular: Negative.    Gastrointestinal: Negative.    Endocrine: Negative.    Genitourinary: Negative.    Musculoskeletal:         Left shoulder pain, left wrist pain   Skin:  Positive for wound.   Allergic/Immunologic: Negative.    Neurological: Negative.    Hematological:  Negative.    Psychiatric/Behavioral: Negative.         Positives and Pertinent negatives as per HPI.     SURGICAL HISTORY     Past Surgical History:   Procedure Laterality Date    OVARIAN CYST SURGERY         CURRENTMEDICATIONS       Discharge Medication List as of 4/24/2025  9:57 PM        CONTINUE these medications which have NOT CHANGED    Details   ALPRAZolam (XANAX) 1 MG tablet Take 1 tablet by mouth 3 times daily as needed for Anxiety. Max Daily Amount: 3 mgHistorical Med      ondansetron (ZOFRAN-ODT) 4 MG disintegrating tablet Take 1 tablet by mouth 3 times daily as needed for Nausea or Vomiting, Disp-21 tablet, R-0Normal      ibuprofen (ADVIL;MOTRIN) 800 MG tablet Take 1 tablet by mouth 3 times daily as neededHistorical Med             ALLERGIES     Morphine and Penicillins    FAMILYHISTORY       Family History   Problem Relation Age of Onset    Heart Disease Mother     Stroke Father     Heart Attack Father         SOCIAL HISTORY       Social History     Tobacco Use    Smoking status: Every Day     Current packs/day: 0.25     Average packs/day: 0.3 packs/day for 9.0 years (2.3 ttl pk-yrs)     Types: Cigarettes    Smokeless tobacco: Never    Tobacco comments:     quit 3 months ago   Vaping Use    Vaping status: Never Used   Substance Use Topics    Alcohol use: No    Drug use: No       SCREENINGS        [place] Coma Scale  Eye Opening: Spontaneous  Best Verbal Response: Oriented  Best Motor Response: Obeys commands  [place] Coma Scale Score: 15                CIWA Assessment  BP: 111/78  Pulse: 79           PHYSICAL EXAM  1 or more Elements     ED Triage Vitals [04/24/25 1934]   BP Systolic BP Percentile Diastolic BP Percentile Temp Temp Source Pulse Respirations SpO2   111/78 -- -- 98.6 °F (37 °C) Oral 79 18 100 %      Height Weight - Scale         1.84 m (6' 0.44\") 71.2 kg (157 lb)             Physical Exam  Vitals and nursing note reviewed. Exam conducted with a chaperone present.   Constitutional: